# Patient Record
Sex: FEMALE | Race: OTHER | Employment: UNEMPLOYED | URBAN - METROPOLITAN AREA
[De-identification: names, ages, dates, MRNs, and addresses within clinical notes are randomized per-mention and may not be internally consistent; named-entity substitution may affect disease eponyms.]

---

## 2017-02-21 RX ORDER — LEVOTHYROXINE SODIUM 88 UG/1
TABLET ORAL
Qty: 90 TABLET | Refills: 0 | Status: SHIPPED | OUTPATIENT
Start: 2017-02-21 | End: 2017-05-16

## 2017-05-16 ENCOUNTER — OFFICE VISIT (OUTPATIENT)
Dept: FAMILY MEDICINE CLINIC | Facility: CLINIC | Age: 57
End: 2017-05-16

## 2017-05-16 VITALS
OXYGEN SATURATION: 99 % | DIASTOLIC BLOOD PRESSURE: 84 MMHG | TEMPERATURE: 98 F | RESPIRATION RATE: 16 BRPM | WEIGHT: 134 LBS | HEART RATE: 63 BPM | BODY MASS INDEX: 24.04 KG/M2 | HEIGHT: 62.5 IN | SYSTOLIC BLOOD PRESSURE: 128 MMHG

## 2017-05-16 DIAGNOSIS — M79.671 RIGHT FOOT PAIN: Primary | ICD-10-CM

## 2017-05-16 DIAGNOSIS — R12 HEARTBURN: ICD-10-CM

## 2017-05-16 PROCEDURE — 99213 OFFICE O/P EST LOW 20 MIN: CPT | Performed by: INTERNAL MEDICINE

## 2017-05-16 RX ORDER — LEVOTHYROXINE SODIUM 88 UG/1
88 TABLET ORAL
Qty: 90 TABLET | Refills: 2 | Status: SHIPPED | OUTPATIENT
Start: 2017-05-16 | End: 2017-06-05

## 2017-05-17 PROBLEM — E03.9 HYPOTHYROID: Status: ACTIVE | Noted: 2017-05-17

## 2017-05-17 NOTE — PROGRESS NOTES
Litzy Hartman is a 56year old female. HPI:   Here with 2 weeks of right foot pain. Recalls a few months ago that she banged her foot into a piece of furniture. Also went on many long walks prior to the pain starting. No swelling or bruising.  Using ice o area of previous burning. No tenderness on exam; soft abdomen  MS: pt is guarding during most of the foot exam; negative ankle exam; point tenderness to the lateral forefoot; no toe pain; no pain with foot squeeze; 1+ pedal pulse; normal gait.   EXT: no cya

## 2017-05-20 ENCOUNTER — HOSPITAL ENCOUNTER (OUTPATIENT)
Dept: GENERAL RADIOLOGY | Age: 57
Discharge: HOME | End: 2017-05-20
Attending: INTERNAL MEDICINE
Payer: COMMERCIAL

## 2017-05-20 DIAGNOSIS — M79.671 RIGHT FOOT PAIN: ICD-10-CM

## 2017-05-20 PROCEDURE — 73630 X-RAY EXAM OF FOOT: CPT | Performed by: INTERNAL MEDICINE

## 2017-05-23 DIAGNOSIS — M84.374A STRESS FRACTURE, RIGHT FOOT, INITIAL ENCOUNTER FOR FRACTURE: Primary | ICD-10-CM

## 2017-06-05 ENCOUNTER — TELEPHONE (OUTPATIENT)
Dept: FAMILY MEDICINE CLINIC | Facility: CLINIC | Age: 57
End: 2017-06-05

## 2017-06-05 RX ORDER — LEVOTHYROXINE SODIUM 88 UG/1
TABLET ORAL
Qty: 90 TABLET | Refills: 0 | OUTPATIENT
Start: 2017-06-05

## 2017-06-05 RX ORDER — LEVOTHYROXINE SODIUM 88 UG/1
88 TABLET ORAL
Qty: 90 TABLET | Refills: 2 | Status: SHIPPED | OUTPATIENT
Start: 2017-06-05 | End: 2018-03-07

## 2017-07-28 ENCOUNTER — OFFICE VISIT (OUTPATIENT)
Dept: FAMILY MEDICINE CLINIC | Facility: CLINIC | Age: 57
End: 2017-07-28

## 2017-07-28 ENCOUNTER — LAB ENCOUNTER (OUTPATIENT)
Dept: LAB | Age: 57
End: 2017-07-28
Attending: FAMILY MEDICINE
Payer: COMMERCIAL

## 2017-07-28 VITALS
RESPIRATION RATE: 18 BRPM | TEMPERATURE: 98 F | SYSTOLIC BLOOD PRESSURE: 116 MMHG | BODY MASS INDEX: 23.33 KG/M2 | DIASTOLIC BLOOD PRESSURE: 84 MMHG | WEIGHT: 130 LBS | HEIGHT: 62.5 IN | HEART RATE: 76 BPM

## 2017-07-28 DIAGNOSIS — M79.604 PAIN IN BOTH LOWER EXTREMITIES: Primary | ICD-10-CM

## 2017-07-28 DIAGNOSIS — M54.5 CHRONIC BILATERAL LOW BACK PAIN, WITH SCIATICA PRESENCE UNSPECIFIED: ICD-10-CM

## 2017-07-28 DIAGNOSIS — G89.29 CHRONIC BILATERAL LOW BACK PAIN, WITH SCIATICA PRESENCE UNSPECIFIED: ICD-10-CM

## 2017-07-28 DIAGNOSIS — M79.605 PAIN IN BOTH LOWER EXTREMITIES: ICD-10-CM

## 2017-07-28 DIAGNOSIS — M79.604 PAIN IN BOTH LOWER EXTREMITIES: ICD-10-CM

## 2017-07-28 DIAGNOSIS — M79.605 PAIN IN BOTH LOWER EXTREMITIES: Primary | ICD-10-CM

## 2017-07-28 LAB
ALBUMIN SERPL-MCNC: 3.9 G/DL (ref 3.5–4.8)
ALP LIVER SERPL-CCNC: 74 U/L (ref 41–108)
ALT SERPL-CCNC: 29 U/L (ref 14–54)
AST SERPL-CCNC: 17 U/L (ref 15–41)
BASOPHILS # BLD AUTO: 0.07 X10(3) UL (ref 0–0.1)
BASOPHILS NFR BLD AUTO: 1 %
BILIRUB SERPL-MCNC: 0.4 MG/DL (ref 0.1–2)
BUN BLD-MCNC: 10 MG/DL (ref 8–20)
CALCIUM BLD-MCNC: 9.1 MG/DL (ref 8.3–10.3)
CHLORIDE: 106 MMOL/L (ref 101–111)
CO2: 26 MMOL/L (ref 22–32)
CREAT BLD-MCNC: 0.83 MG/DL (ref 0.55–1.02)
EOSINOPHIL # BLD AUTO: 0.06 X10(3) UL (ref 0–0.3)
EOSINOPHIL NFR BLD AUTO: 0.8 %
ERYTHROCYTE [DISTWIDTH] IN BLOOD BY AUTOMATED COUNT: 12.1 % (ref 11.5–16)
FREE T4: 1.4 NG/DL (ref 0.9–1.8)
GLUCOSE BLD-MCNC: 88 MG/DL (ref 70–99)
HCT VFR BLD AUTO: 43.5 % (ref 34–50)
HGB BLD-MCNC: 14.7 G/DL (ref 12–16)
IMMATURE GRANULOCYTE COUNT: 0.01 X10(3) UL (ref 0–1)
IMMATURE GRANULOCYTE RATIO %: 0.1 %
LYMPHOCYTES # BLD AUTO: 2.84 X10(3) UL (ref 0.9–4)
LYMPHOCYTES NFR BLD AUTO: 38.7 %
M PROTEIN MFR SERPL ELPH: 8.2 G/DL (ref 6.1–8.3)
MCH RBC QN AUTO: 31.1 PG (ref 27–33.2)
MCHC RBC AUTO-ENTMCNC: 33.8 G/DL (ref 31–37)
MCV RBC AUTO: 92 FL (ref 81–100)
MONOCYTES # BLD AUTO: 0.44 X10(3) UL (ref 0.1–0.6)
MONOCYTES NFR BLD AUTO: 6 %
NEUTROPHIL ABS PRELIM: 3.91 X10 (3) UL (ref 1.3–6.7)
NEUTROPHILS # BLD AUTO: 3.91 X10(3) UL (ref 1.3–6.7)
NEUTROPHILS NFR BLD AUTO: 53.4 %
PLATELET # BLD AUTO: 375 10(3)UL (ref 150–450)
POTASSIUM SERPL-SCNC: 3.8 MMOL/L (ref 3.6–5.1)
RBC # BLD AUTO: 4.73 X10(6)UL (ref 3.8–5.1)
RED CELL DISTRIBUTION WIDTH-SD: 41.1 FL (ref 35.1–46.3)
RHEUMATOID FACT SERPL-ACNC: <10 IU/ML (ref ?–15)
SED RATE-ML: 16 MM/HR (ref 0–25)
SODIUM SERPL-SCNC: 141 MMOL/L (ref 136–144)
TSI SER-ACNC: 2.66 MIU/ML (ref 0.35–5.5)
WBC # BLD AUTO: 7.3 X10(3) UL (ref 4–13)

## 2017-07-28 PROCEDURE — 86431 RHEUMATOID FACTOR QUANT: CPT | Performed by: FAMILY MEDICINE

## 2017-07-28 PROCEDURE — 86200 CCP ANTIBODY: CPT | Performed by: FAMILY MEDICINE

## 2017-07-28 PROCEDURE — 99213 OFFICE O/P EST LOW 20 MIN: CPT | Performed by: FAMILY MEDICINE

## 2017-07-28 PROCEDURE — 85652 RBC SED RATE AUTOMATED: CPT | Performed by: FAMILY MEDICINE

## 2017-07-28 PROCEDURE — 36415 COLL VENOUS BLD VENIPUNCTURE: CPT | Performed by: FAMILY MEDICINE

## 2017-07-28 PROCEDURE — 84439 ASSAY OF FREE THYROXINE: CPT | Performed by: FAMILY MEDICINE

## 2017-07-28 PROCEDURE — 86038 ANTINUCLEAR ANTIBODIES: CPT | Performed by: FAMILY MEDICINE

## 2017-07-28 PROCEDURE — 80050 GENERAL HEALTH PANEL: CPT | Performed by: FAMILY MEDICINE

## 2017-07-28 NOTE — PROGRESS NOTES
HPI:    Patient ID: Janis Garcia is a 56year old female. 3 months ago was in [place] and then had episode of diarrhea. No melena, hematochezia. After that thinks her symptoms started. First started as right foot pain.   Then recently over the last TSH+FREE T4; Future  - DAVION, DIRECT, REFLEX TO 9 ENAS; Future  - RHEUMATOID ARTHRITIS FACTOR; Future  - SED RATE, WESTERGREN (AUTOMATED); Future  - DAVION, DIRECT, REFLEX TO 9 ENAS; Future  - CYCLIC CITRULLINATE PEP. IGG; Future    2.  Chronic bilateral low brandie

## 2017-07-30 LAB
ANA SCREEN: NEGATIVE
CYCLIC CITRULLINATED PEPTIDE: 3 UNITS

## 2017-10-07 ENCOUNTER — TELEPHONE (OUTPATIENT)
Dept: FAMILY MEDICINE CLINIC | Facility: CLINIC | Age: 57
End: 2017-10-07

## 2017-10-07 DIAGNOSIS — M79.605 BILATERAL LEG PAIN: Primary | ICD-10-CM

## 2017-10-07 DIAGNOSIS — M79.604 BILATERAL LEG PAIN: Primary | ICD-10-CM

## 2017-11-28 ENCOUNTER — OFFICE VISIT (OUTPATIENT)
Dept: NEUROLOGY | Facility: CLINIC | Age: 57
End: 2017-11-28

## 2017-11-28 VITALS
RESPIRATION RATE: 16 BRPM | HEART RATE: 80 BPM | SYSTOLIC BLOOD PRESSURE: 122 MMHG | WEIGHT: 138 LBS | BODY MASS INDEX: 25 KG/M2 | DIASTOLIC BLOOD PRESSURE: 76 MMHG

## 2017-11-28 DIAGNOSIS — M79.671 RIGHT FOOT PAIN: Primary | ICD-10-CM

## 2017-11-28 DIAGNOSIS — M79.604 RIGHT LEG PAIN: ICD-10-CM

## 2017-11-28 PROCEDURE — 99244 OFF/OP CNSLTJ NEW/EST MOD 40: CPT | Performed by: OTHER

## 2017-11-28 RX ORDER — ACETAMINOPHEN 160 MG
2000 TABLET,DISINTEGRATING ORAL DAILY
COMMUNITY

## 2017-11-28 NOTE — PATIENT INSTRUCTIONS
Refill policies:    • Allow 2-3 business days for refills; controlled substances may take longer.   • Contact your pharmacy at least 5 days prior to running out of medication and have them send an electronic request or submit request through the [facility] have a procedure or additional testing performed. [facility]) will contact your insurance carrier to obtain pre-certification or prior authorization.     Unfortunately, [facility] has seen an increase in denial of payment even though the p

## 2017-11-28 NOTE — PROGRESS NOTES
Pt here for evaluation of pain in right foot. Pt denies any numbness/tingling/burning. Pain is intermittent and varies in intensity, and hurts more when patient has to stand for long periods of time, walking, or in certain positions.   Pain has been prese

## 2017-11-30 NOTE — PROGRESS NOTES
HPI:    Patient ID: Elsa León is a 56year old female. Referring provider: Dr Nisha Gunter    Thank you for requesting this consultation to us. Below is the summary of my evaluation.      HPI    Elsa León is a 56year old female who presented for e arthralgias and back pain. Negative for joint swelling, myalgias, neck pain and neck stiffness. Skin: Negative. Allergic/Immunologic: Negative. Neurological: Negative for dizziness, facial asymmetry, weakness and numbness.    Hematological: Negative vibration and proprioception  Motor: Normal tone and bulk in all extremities. Strength is 5/5 in all muscle groups  Reflexes: symmetric and present, trace achilles reflexes bilaterally  Coordination: Intact   Gait: Normal based and station and steady.

## 2017-12-22 ENCOUNTER — HOSPITAL ENCOUNTER (OUTPATIENT)
Dept: MRI IMAGING | Facility: HOSPITAL | Age: 57
Discharge: HOME | End: 2017-12-22
Attending: Other
Payer: COMMERCIAL

## 2017-12-22 DIAGNOSIS — M79.671 RIGHT FOOT PAIN: ICD-10-CM

## 2017-12-22 PROCEDURE — 72148 MRI LUMBAR SPINE W/O DYE: CPT | Performed by: OTHER

## 2018-01-02 ENCOUNTER — TELEPHONE (OUTPATIENT)
Dept: NEUROLOGY | Facility: CLINIC | Age: 58
End: 2018-01-02

## 2018-01-03 NOTE — TELEPHONE ENCOUNTER
Left detailed message on 's confidential voice mail (OK per HIPAA) relaying message from Dr. Darryl Guerrero regarding MRI results. Confirmed that patient had EMG appt on 1/17/18 and f/u appt on 1/24/18. Encouraged pt to c/b office with any questions.

## 2018-01-17 ENCOUNTER — OFFICE VISIT (OUTPATIENT)
Dept: ELECTROPHYSIOLOGY | Facility: HOSPITAL | Age: 58
End: 2018-01-17
Attending: Other
Payer: COMMERCIAL

## 2018-01-17 DIAGNOSIS — M79.606 LEG PAIN: ICD-10-CM

## 2018-01-18 NOTE — PROCEDURES
[address]      PATIENT'S NAME: Rosalina Guo   REFERRING PHYSICIAN: Cinthia Leone M.D.    PATIENT ACCOUNT #: [de-identified] LOCATION: [facility]   MEDICAL RECORD #: [id number] DATE OF BIRTH: [birth year]

## 2018-01-24 ENCOUNTER — OFFICE VISIT (OUTPATIENT)
Dept: NEUROLOGY | Facility: CLINIC | Age: 58
End: 2018-01-24

## 2018-01-24 VITALS
WEIGHT: 140 LBS | HEART RATE: 88 BPM | SYSTOLIC BLOOD PRESSURE: 122 MMHG | BODY MASS INDEX: 25 KG/M2 | RESPIRATION RATE: 16 BRPM | DIASTOLIC BLOOD PRESSURE: 82 MMHG

## 2018-01-24 DIAGNOSIS — M79.671 RIGHT FOOT PAIN: ICD-10-CM

## 2018-01-24 DIAGNOSIS — M51.36 DEGENERATIVE DISC DISEASE, LUMBAR: Primary | ICD-10-CM

## 2018-01-24 PROCEDURE — 99213 OFFICE O/P EST LOW 20 MIN: CPT | Performed by: OTHER

## 2018-01-24 NOTE — PATIENT INSTRUCTIONS
Refill policies:    • Allow 2-3 business days for refills; controlled substances may take longer.   • Contact your pharmacy at least 5 days prior to running out of medication and have them send an electronic request or submit request through the [facility] recommended that you have a procedure or additional testing performed. [facility]) will contact your insurance carrier to obtain pre-certification or prior authorization.     Unfortunately, [facility] has seen an increase in denial of paym

## 2018-01-24 NOTE — PROGRESS NOTES
HPI:    Patient ID: Ciro Libyan is a 56year old female. Leg Pain    Pertinent negatives include no numbness. Patient is 56year old female who presented for follow up for right foot pain.  MRI lumbar spine minimal degenerative changes and mild f Gastrointestinal: Negative. Endocrine: Negative. Genitourinary: Negative. Musculoskeletal: Positive for arthralgias and back pain. Negative for joint swelling, myalgias, neck pain and neck stiffness. Skin: Negative.     Allergic/Immunologic: N pinprick, vibration and proprioception  Motor: Normal tone and bulk in all extremities.  Strength is 5/5 in all muscle groups  Reflexes: symmetric and present, trace achilles reflexes bilaterally  Coordination: Intact   Gait: Normal based and station and st

## 2018-03-07 RX ORDER — LEVOTHYROXINE SODIUM 88 UG/1
TABLET ORAL
Qty: 90 TABLET | Refills: 0 | Status: SHIPPED | OUTPATIENT
Start: 2018-03-07 | End: 2018-06-06

## 2018-03-14 ENCOUNTER — TELEPHONE (OUTPATIENT)
Dept: FAMILY MEDICINE CLINIC | Facility: CLINIC | Age: 58
End: 2018-03-14

## 2018-03-14 NOTE — TELEPHONE ENCOUNTER
Fax sent to us from [address], [address]    Billing information for patient. Advising us that patient will . Enveloped and put up at the front counter.

## 2018-06-06 RX ORDER — LEVOTHYROXINE SODIUM 88 UG/1
TABLET ORAL
Qty: 90 TABLET | Refills: 0 | Status: SHIPPED | OUTPATIENT
Start: 2018-06-06 | End: 2018-09-19

## 2018-09-17 RX ORDER — LEVOTHYROXINE SODIUM 88 UG/1
TABLET ORAL
Qty: 90 TABLET | Refills: 0 | OUTPATIENT
Start: 2018-09-17

## 2018-09-18 NOTE — TELEPHONE ENCOUNTER
Left patient message that Rajiv Fisher needs her to make an appt to fill her LEVOTHYROXINE SODIUM 88 MCG Oral Tab. TAKE 1 TABLET(88 MCG) BY MOUTH EVERY DAY. 90 Tabs.

## 2018-09-19 RX ORDER — LEVOTHYROXINE SODIUM 88 UG/1
TABLET ORAL
Qty: 30 TABLET | Refills: 0 | Status: SHIPPED | OUTPATIENT
Start: 2018-09-19 | End: 2018-10-15

## 2018-09-19 NOTE — TELEPHONE ENCOUNTER
Approve/Deny Levothyroxine   Last saw Dr Gilberto Ferrari 7/2017  Last filled 6/2018  Pt has appt scheduled 10/15/2018

## 2018-10-15 ENCOUNTER — OFFICE VISIT (OUTPATIENT)
Dept: FAMILY MEDICINE CLINIC | Facility: CLINIC | Age: 58
End: 2018-10-15
Payer: COMMERCIAL

## 2018-10-15 VITALS
HEART RATE: 70 BPM | DIASTOLIC BLOOD PRESSURE: 85 MMHG | WEIGHT: 142 LBS | HEIGHT: 62 IN | TEMPERATURE: 98 F | RESPIRATION RATE: 16 BRPM | BODY MASS INDEX: 26.13 KG/M2 | SYSTOLIC BLOOD PRESSURE: 125 MMHG | OXYGEN SATURATION: 98 %

## 2018-10-15 DIAGNOSIS — E55.9 VITAMIN D DEFICIENCY: ICD-10-CM

## 2018-10-15 DIAGNOSIS — R09.82 PND (POST-NASAL DRIP): ICD-10-CM

## 2018-10-15 DIAGNOSIS — Z12.31 ENCOUNTER FOR SCREENING MAMMOGRAM FOR HIGH-RISK PATIENT: ICD-10-CM

## 2018-10-15 DIAGNOSIS — Z12.11 COLON CANCER SCREENING: ICD-10-CM

## 2018-10-15 DIAGNOSIS — E03.8 OTHER SPECIFIED HYPOTHYROIDISM: Primary | ICD-10-CM

## 2018-10-15 DIAGNOSIS — E78.00 ELEVATED CHOLESTEROL: ICD-10-CM

## 2018-10-15 PROCEDURE — 99214 OFFICE O/P EST MOD 30 MIN: CPT | Performed by: FAMILY MEDICINE

## 2018-10-15 RX ORDER — FLUTICASONE PROPIONATE 50 MCG
2 SPRAY, SUSPENSION (ML) NASAL NIGHTLY
Qty: 1 BOTTLE | Refills: 0 | Status: SHIPPED | OUTPATIENT
Start: 2018-10-15 | End: 2018-11-14

## 2018-10-15 RX ORDER — LEVOTHYROXINE SODIUM 88 UG/1
TABLET ORAL
Qty: 30 TABLET | Refills: 0 | Status: SHIPPED | OUTPATIENT
Start: 2018-10-15 | End: 2018-11-14

## 2018-10-15 NOTE — PROGRESS NOTES
HPI:   Kyaw Rai is a 66year old female who presents for thyroid, cholesterol and throat concerns     Pt denies thyroid symptoms   No labs since 7/17     H/o elevated cholesterol   No regular exercise     Pt overdue for mammogram / colon cancer screen normal, cobblestoning of PP, pale nasal mucosa, no sinus tenderness   EYES:PERRLA, EOMI, normal,conjunctiva are clear  SKIN: norashes,no suspicious lesions  MUSCULOSKELETAL: back is not tender,FROM of the back  EXTREMITIES: no cyanosis, clubbing or edema

## 2018-10-18 RX ORDER — LEVOTHYROXINE SODIUM 88 UG/1
TABLET ORAL
Qty: 30 TABLET | Refills: 0 | OUTPATIENT
Start: 2018-10-18

## 2018-10-27 ENCOUNTER — APPOINTMENT (OUTPATIENT)
Dept: LAB | Age: 58
End: 2018-10-27
Attending: FAMILY MEDICINE
Payer: COMMERCIAL

## 2018-10-27 DIAGNOSIS — E55.9 VITAMIN D DEFICIENCY: ICD-10-CM

## 2018-10-27 DIAGNOSIS — E03.8 OTHER SPECIFIED HYPOTHYROIDISM: ICD-10-CM

## 2018-10-27 DIAGNOSIS — E78.00 ELEVATED CHOLESTEROL: ICD-10-CM

## 2018-10-27 PROCEDURE — 36415 COLL VENOUS BLD VENIPUNCTURE: CPT | Performed by: FAMILY MEDICINE

## 2018-10-27 PROCEDURE — 80061 LIPID PANEL: CPT | Performed by: FAMILY MEDICINE

## 2018-10-27 PROCEDURE — 84443 ASSAY THYROID STIM HORMONE: CPT | Performed by: FAMILY MEDICINE

## 2018-10-27 PROCEDURE — 84439 ASSAY OF FREE THYROXINE: CPT | Performed by: FAMILY MEDICINE

## 2018-10-27 PROCEDURE — 82306 VITAMIN D 25 HYDROXY: CPT | Performed by: FAMILY MEDICINE

## 2018-11-14 DIAGNOSIS — E03.8 OTHER SPECIFIED HYPOTHYROIDISM: ICD-10-CM

## 2018-11-14 DIAGNOSIS — R09.82 PND (POST-NASAL DRIP): ICD-10-CM

## 2018-11-14 RX ORDER — FLUTICASONE PROPIONATE 50 MCG
SPRAY, SUSPENSION (ML) NASAL
Qty: 1 BOTTLE | Refills: 0 | Status: SHIPPED | OUTPATIENT
Start: 2018-11-14 | End: 2019-05-14

## 2018-11-14 RX ORDER — LEVOTHYROXINE SODIUM 88 UG/1
TABLET ORAL
Qty: 90 TABLET | Refills: 1 | Status: SHIPPED | OUTPATIENT
Start: 2018-11-14 | End: 2019-05-14

## 2018-11-14 NOTE — TELEPHONE ENCOUNTER
Rx Request  Levothyroxine Sodium 88 MCG Oral Tab  Disp:        30            R: 0  Associated Dx: Other specified hypothyroidism  Last Refilled:10/15/2018    Fluticasone Propionate 50 MCG/ACT Nasal Suspension  Disp:    1 bottle                R: o  Associated Dx: PND (post-nasal drip)  Last Refilled:10/15/2018    Last Visit: 10/15/2018

## 2019-05-14 ENCOUNTER — OFFICE VISIT (OUTPATIENT)
Dept: FAMILY MEDICINE CLINIC | Facility: CLINIC | Age: 59
End: 2019-05-14
Payer: COMMERCIAL

## 2019-05-14 VITALS
BODY MASS INDEX: 24.4 KG/M2 | TEMPERATURE: 99 F | RESPIRATION RATE: 20 BRPM | HEIGHT: 62.5 IN | WEIGHT: 136 LBS | DIASTOLIC BLOOD PRESSURE: 86 MMHG | SYSTOLIC BLOOD PRESSURE: 122 MMHG | HEART RATE: 70 BPM | OXYGEN SATURATION: 97 %

## 2019-05-14 DIAGNOSIS — E03.9 HYPOTHYROIDISM, UNSPECIFIED TYPE: ICD-10-CM

## 2019-05-14 DIAGNOSIS — E78.5 HYPERLIPIDEMIA, UNSPECIFIED HYPERLIPIDEMIA TYPE: ICD-10-CM

## 2019-05-14 DIAGNOSIS — R51.9 HEADACHE DISORDER: Primary | ICD-10-CM

## 2019-05-14 PROCEDURE — 99214 OFFICE O/P EST MOD 30 MIN: CPT | Performed by: FAMILY MEDICINE

## 2019-05-14 RX ORDER — EZETIMIBE 10 MG/1
10 TABLET ORAL NIGHTLY
Qty: 30 TABLET | Refills: 1 | Status: SHIPPED | OUTPATIENT
Start: 2019-05-14 | End: 2019-07-24

## 2019-05-14 RX ORDER — LEVOTHYROXINE SODIUM 88 UG/1
88 TABLET ORAL
Qty: 90 TABLET | Refills: 1 | Status: SHIPPED | OUTPATIENT
Start: 2019-05-14 | End: 2019-09-27

## 2019-05-14 NOTE — PROGRESS NOTES
HPI:    Patient ID: Marva Mohs is a 64year old female. Headache    This is a chronic problem. The current episode started more than 1 month ago (since november). The problem occurs intermittently (only in the evenings). The problem has been unchanged. Vitamin D3 2000 units Oral Cap Take 2,000 Units by mouth daily. Disp:  Rfl:      Allergies:  Erythromycin            NAUSEA ONLY   PHYSICAL EXAM:   Physical Exam   Constitutional: She appears well-developed and well-nourished.    Eyes: Conjunctivae are no

## 2019-07-24 DIAGNOSIS — E78.5 HYPERLIPIDEMIA, UNSPECIFIED HYPERLIPIDEMIA TYPE: ICD-10-CM

## 2019-07-24 RX ORDER — EZETIMIBE 10 MG/1
TABLET ORAL
Qty: 30 TABLET | Refills: 0 | Status: SHIPPED | OUTPATIENT
Start: 2019-07-24 | End: 2019-08-22

## 2019-08-22 DIAGNOSIS — E78.5 HYPERLIPIDEMIA, UNSPECIFIED HYPERLIPIDEMIA TYPE: ICD-10-CM

## 2019-08-22 RX ORDER — EZETIMIBE 10 MG/1
TABLET ORAL
Qty: 30 TABLET | Refills: 0 | Status: SHIPPED | OUTPATIENT
Start: 2019-08-22 | End: 2019-10-01

## 2019-09-07 ENCOUNTER — LABORATORY ENCOUNTER (OUTPATIENT)
Dept: LAB | Age: 59
End: 2019-09-07
Attending: FAMILY MEDICINE
Payer: COMMERCIAL

## 2019-09-07 DIAGNOSIS — E78.2 ELEVATED TRIGLYCERIDES WITH HIGH CHOLESTEROL: ICD-10-CM

## 2019-09-07 DIAGNOSIS — E78.00 ELEVATED CHOLESTEROL: ICD-10-CM

## 2019-09-07 DIAGNOSIS — E78.5 HYPERLIPIDEMIA, UNSPECIFIED HYPERLIPIDEMIA TYPE: ICD-10-CM

## 2019-09-07 DIAGNOSIS — E03.9 HYPOTHYROIDISM, UNSPECIFIED TYPE: ICD-10-CM

## 2019-09-07 LAB
ALBUMIN SERPL-MCNC: 3.6 G/DL (ref 3.4–5)
ALBUMIN/GLOB SERPL: 0.9 {RATIO} (ref 1–2)
ALP LIVER SERPL-CCNC: 75 U/L (ref 46–118)
ALT SERPL-CCNC: 38 U/L (ref 13–56)
ANION GAP SERPL CALC-SCNC: 7 MMOL/L (ref 0–18)
AST SERPL-CCNC: 20 U/L (ref 15–37)
BILIRUB SERPL-MCNC: 0.7 MG/DL (ref 0.1–2)
BUN BLD-MCNC: 15 MG/DL (ref 7–18)
BUN/CREAT SERPL: 17 (ref 10–20)
CALCIUM BLD-MCNC: 9.2 MG/DL (ref 8.5–10.1)
CHLORIDE SERPL-SCNC: 107 MMOL/L (ref 98–112)
CHOLEST SMN-MCNC: 263 MG/DL (ref ?–200)
CO2 SERPL-SCNC: 27 MMOL/L (ref 21–32)
CREAT BLD-MCNC: 0.88 MG/DL (ref 0.55–1.02)
GLOBULIN PLAS-MCNC: 4.1 G/DL (ref 2.8–4.4)
GLUCOSE BLD-MCNC: 104 MG/DL (ref 70–99)
HDLC SERPL-MCNC: 37 MG/DL (ref 40–59)
LDLC SERPL CALC-MCNC: 165 MG/DL (ref ?–100)
M PROTEIN MFR SERPL ELPH: 7.7 G/DL (ref 6.4–8.2)
NONHDLC SERPL-MCNC: 226 MG/DL (ref ?–130)
OSMOLALITY SERPL CALC.SUM OF ELEC: 293 MOSM/KG (ref 275–295)
POTASSIUM SERPL-SCNC: 4.9 MMOL/L (ref 3.5–5.1)
SODIUM SERPL-SCNC: 141 MMOL/L (ref 136–145)
T4 FREE SERPL-MCNC: 1.2 NG/DL (ref 0.8–1.7)
TRIGL SERPL-MCNC: 303 MG/DL (ref 30–149)
TSI SER-ACNC: 5.2 MIU/ML (ref 0.36–3.74)
VLDLC SERPL CALC-MCNC: 61 MG/DL (ref 0–30)

## 2019-09-07 PROCEDURE — 80061 LIPID PANEL: CPT | Performed by: FAMILY MEDICINE

## 2019-09-07 PROCEDURE — 36415 COLL VENOUS BLD VENIPUNCTURE: CPT | Performed by: FAMILY MEDICINE

## 2019-09-07 PROCEDURE — 84443 ASSAY THYROID STIM HORMONE: CPT | Performed by: FAMILY MEDICINE

## 2019-09-07 PROCEDURE — 84439 ASSAY OF FREE THYROXINE: CPT | Performed by: FAMILY MEDICINE

## 2019-09-07 PROCEDURE — 80053 COMPREHEN METABOLIC PANEL: CPT | Performed by: FAMILY MEDICINE

## 2019-09-16 ENCOUNTER — TELEPHONE (OUTPATIENT)
Dept: FAMILY MEDICINE CLINIC | Facility: CLINIC | Age: 59
End: 2019-09-16

## 2019-09-21 ENCOUNTER — APPOINTMENT (OUTPATIENT)
Dept: LAB | Age: 59
End: 2019-09-21
Attending: FAMILY MEDICINE
Payer: COMMERCIAL

## 2019-09-21 DIAGNOSIS — R73.09 ELEVATED GLUCOSE: ICD-10-CM

## 2019-09-21 LAB
EST. AVERAGE GLUCOSE BLD GHB EST-MCNC: 126 MG/DL (ref 68–126)
HBA1C MFR BLD HPLC: 6 % (ref ?–5.7)

## 2019-09-21 PROCEDURE — 36415 COLL VENOUS BLD VENIPUNCTURE: CPT

## 2019-09-21 PROCEDURE — 83036 HEMOGLOBIN GLYCOSYLATED A1C: CPT

## 2019-09-24 ENCOUNTER — TELEPHONE (OUTPATIENT)
Dept: FAMILY MEDICINE CLINIC | Facility: CLINIC | Age: 59
End: 2019-09-24

## 2019-09-27 DIAGNOSIS — E03.9 HYPOTHYROIDISM, UNSPECIFIED TYPE: ICD-10-CM

## 2019-09-27 RX ORDER — LEVOTHYROXINE SODIUM 88 UG/1
TABLET ORAL
Qty: 90 TABLET | Refills: 1 | Status: SHIPPED | OUTPATIENT
Start: 2019-09-27 | End: 2019-12-30

## 2019-10-01 ENCOUNTER — OFFICE VISIT (OUTPATIENT)
Dept: FAMILY MEDICINE CLINIC | Facility: CLINIC | Age: 59
End: 2019-10-01
Payer: COMMERCIAL

## 2019-10-01 VITALS
HEIGHT: 62.5 IN | RESPIRATION RATE: 18 BRPM | DIASTOLIC BLOOD PRESSURE: 74 MMHG | SYSTOLIC BLOOD PRESSURE: 118 MMHG | BODY MASS INDEX: 24.22 KG/M2 | WEIGHT: 135 LBS | OXYGEN SATURATION: 99 % | HEART RATE: 90 BPM

## 2019-10-01 DIAGNOSIS — E03.9 HYPOTHYROIDISM, UNSPECIFIED TYPE: ICD-10-CM

## 2019-10-01 DIAGNOSIS — R73.03 PREDIABETES: ICD-10-CM

## 2019-10-01 DIAGNOSIS — E78.5 HYPERLIPIDEMIA, UNSPECIFIED HYPERLIPIDEMIA TYPE: Primary | ICD-10-CM

## 2019-10-01 DIAGNOSIS — R51.9 HEADACHE DISORDER: ICD-10-CM

## 2019-10-01 DIAGNOSIS — Z12.39 BREAST CANCER SCREENING: ICD-10-CM

## 2019-10-01 PROCEDURE — 99214 OFFICE O/P EST MOD 30 MIN: CPT | Performed by: FAMILY MEDICINE

## 2019-10-01 NOTE — PATIENT INSTRUCTIONS
Evolocumab injection  Brand Name: Yaritza Pearce  What is this medicine? EVOLOCUMAB (e voe VIVIANA ue mab) is known as a PCSK9 inhibitor. It is used to lower the level of cholesterol in the blood.  It may be used alone or in combination with other cholesterol-lower If you miss a dose, take it as soon as you can if there are more than 7 days until the next scheduled dose, or skip the missed dose and take the next dose according to your original schedule. Do not take double or extra doses.   Where should I keep my medic

## 2019-10-01 NOTE — PROGRESS NOTES
HPI:    Patient ID: Ciro Weber is a 64year old female. Pt states that her sleep schedule has been off considering she falls asleep at 4-5 am and then she normally sleeps all day.  Pt has been noncompliant with mammograms and cologuard in the past. other systems reviewed and are negative.              Current Outpatient Medications:  LEVOTHYROXINE SODIUM 88 MCG Oral Tab TAKE 1 TABLET(88 MCG) BY MOUTH BEFORE BREAKFAST Disp: 90 tablet Rfl: 1   Vitamin D3 2000 units Oral Cap Take 2,000 Units by mouth janette [E]      Comp Metabolic Panel (14) [E]      CBC W Differential W Platelet [E]      Meds This Visit:  Requested Prescriptions      No prescriptions requested or ordered in this encounter       Imaging & Referrals:  KIRILL BANDA 2D+3D SCREENING BILAT (CPT=77067/

## 2019-12-30 ENCOUNTER — TELEPHONE (OUTPATIENT)
Dept: FAMILY MEDICINE CLINIC | Facility: CLINIC | Age: 59
End: 2019-12-30

## 2019-12-30 DIAGNOSIS — E03.9 HYPOTHYROIDISM, UNSPECIFIED TYPE: ICD-10-CM

## 2019-12-30 RX ORDER — LEVOTHYROXINE SODIUM 88 UG/1
TABLET ORAL
Qty: 90 TABLET | Refills: 1 | Status: SHIPPED | OUTPATIENT
Start: 2019-12-30 | End: 2020-06-16

## 2019-12-30 NOTE — TELEPHONE ENCOUNTER
Patient is needing refill urgently as she is leaving the country tomorrow for 10 weeks. Requesting 2 more refills of LEVOTHYROXINE SODIUM 88 MCG Oral Tab    Please advise ASAP.

## 2020-06-16 DIAGNOSIS — E03.9 HYPOTHYROIDISM, UNSPECIFIED TYPE: ICD-10-CM

## 2020-06-16 RX ORDER — LEVOTHYROXINE SODIUM 88 UG/1
TABLET ORAL
Qty: 90 TABLET | Refills: 1 | Status: SHIPPED | OUTPATIENT
Start: 2020-06-16 | End: 2020-11-30

## 2020-11-30 DIAGNOSIS — E78.5 HYPERLIPIDEMIA, UNSPECIFIED HYPERLIPIDEMIA TYPE: ICD-10-CM

## 2020-11-30 DIAGNOSIS — Z13.0 ENCOUNTER FOR SCREENING FOR HEMATOLOGIC DISORDER: ICD-10-CM

## 2020-11-30 DIAGNOSIS — E03.9 HYPOTHYROIDISM, UNSPECIFIED TYPE: Primary | ICD-10-CM

## 2020-12-02 RX ORDER — LEVOTHYROXINE SODIUM 88 UG/1
88 TABLET ORAL
Qty: 90 TABLET | Refills: 0 | Status: SHIPPED | OUTPATIENT
Start: 2020-12-02 | End: 2021-06-08

## 2021-02-01 ENCOUNTER — TELEPHONE (OUTPATIENT)
Dept: FAMILY MEDICINE CLINIC | Facility: CLINIC | Age: 61
End: 2021-02-01

## 2021-02-01 DIAGNOSIS — Z71.84 TRAVEL ADVICE ENCOUNTER: Primary | ICD-10-CM

## 2021-02-01 NOTE — TELEPHONE ENCOUNTER
said [place] and needs to add her blood type in some forms. He's asking for an order to be added to find out her blood type.   They are not looking to do a blood donation at this time

## 2021-02-02 NOTE — TELEPHONE ENCOUNTER
Diane notified, verbalized understanding. Advised to reach out to insurance for coverage, as it may not be covered due to medical necessity. Billing phone number provided.

## 2021-02-03 NOTE — TELEPHONE ENCOUNTER
Follow up in 0-4 weeks.  Otherwise no further refills
LVM to sched appt
Metformin - said pharmacy sent request - [facility]ala
Please call Pt to schedule a medication f/u.  No further refills without an appointment
Refill request:  LEVOTHYROXINE SODIUM 88 MCG Oral Tab    2020 6 months supply  10/1/2019 outstanding labs,  notified, they are requesting 2 month supply of meds, I reordered labs so that the order is not .    Approve/deny:
20

## 2021-03-10 ENCOUNTER — LABORATORY ENCOUNTER (OUTPATIENT)
Dept: LAB | Age: 61
End: 2021-03-10
Attending: FAMILY MEDICINE
Payer: COMMERCIAL

## 2021-03-10 DIAGNOSIS — Z71.84 TRAVEL ADVICE ENCOUNTER: ICD-10-CM

## 2021-03-10 DIAGNOSIS — Z13.0 ENCOUNTER FOR SCREENING FOR HEMATOLOGIC DISORDER: ICD-10-CM

## 2021-03-10 DIAGNOSIS — E03.9 HYPOTHYROIDISM, UNSPECIFIED TYPE: ICD-10-CM

## 2021-03-10 DIAGNOSIS — E78.5 HYPERLIPIDEMIA, UNSPECIFIED HYPERLIPIDEMIA TYPE: ICD-10-CM

## 2021-03-10 LAB
ALBUMIN SERPL-MCNC: 3.9 G/DL (ref 3.4–5)
ALBUMIN/GLOB SERPL: 0.9 {RATIO} (ref 1–2)
ALP LIVER SERPL-CCNC: 71 U/L
ALT SERPL-CCNC: 29 U/L
ANION GAP SERPL CALC-SCNC: 3 MMOL/L (ref 0–18)
AST SERPL-CCNC: 17 U/L (ref 15–37)
BASOPHILS # BLD AUTO: 0.07 X10(3) UL (ref 0–0.2)
BASOPHILS NFR BLD AUTO: 0.8 %
BILIRUB SERPL-MCNC: 0.5 MG/DL (ref 0.1–2)
BUN BLD-MCNC: 9 MG/DL (ref 7–18)
BUN/CREAT SERPL: 11.3 (ref 10–20)
CALCIUM BLD-MCNC: 9.6 MG/DL (ref 8.5–10.1)
CHLORIDE SERPL-SCNC: 105 MMOL/L (ref 98–112)
CHOLEST SMN-MCNC: 304 MG/DL (ref ?–200)
CO2 SERPL-SCNC: 29 MMOL/L (ref 21–32)
CREAT BLD-MCNC: 0.8 MG/DL
DEPRECATED RDW RBC AUTO: 41.1 FL (ref 35.1–46.3)
EOSINOPHIL # BLD AUTO: 0.07 X10(3) UL (ref 0–0.7)
EOSINOPHIL NFR BLD AUTO: 0.8 %
ERYTHROCYTE [DISTWIDTH] IN BLOOD BY AUTOMATED COUNT: 11.9 % (ref 11–15)
GLOBULIN PLAS-MCNC: 4.3 G/DL (ref 2.8–4.4)
GLUCOSE BLD-MCNC: 99 MG/DL (ref 70–99)
HCT VFR BLD AUTO: 45.5 %
HDLC SERPL-MCNC: 47 MG/DL (ref 40–59)
HGB BLD-MCNC: 15 G/DL
IMM GRANULOCYTES # BLD AUTO: 0.03 X10(3) UL (ref 0–1)
IMM GRANULOCYTES NFR BLD: 0.3 %
LDLC SERPL CALC-MCNC: 195 MG/DL (ref ?–100)
LYMPHOCYTES # BLD AUTO: 3.21 X10(3) UL (ref 1–4)
LYMPHOCYTES NFR BLD AUTO: 35.6 %
M PROTEIN MFR SERPL ELPH: 8.2 G/DL (ref 6.4–8.2)
MCH RBC QN AUTO: 31.4 PG (ref 26–34)
MCHC RBC AUTO-ENTMCNC: 33 G/DL (ref 31–37)
MCV RBC AUTO: 95.2 FL
MONOCYTES # BLD AUTO: 0.5 X10(3) UL (ref 0.1–1)
MONOCYTES NFR BLD AUTO: 5.5 %
NEUTROPHILS # BLD AUTO: 5.14 X10 (3) UL (ref 1.5–7.7)
NEUTROPHILS # BLD AUTO: 5.14 X10(3) UL (ref 1.5–7.7)
NEUTROPHILS NFR BLD AUTO: 57 %
NONHDLC SERPL-MCNC: 257 MG/DL (ref ?–130)
OSMOLALITY SERPL CALC.SUM OF ELEC: 283 MOSM/KG (ref 275–295)
PATIENT FASTING Y/N/NP: YES
PATIENT FASTING Y/N/NP: YES
PLATELET # BLD AUTO: 392 10(3)UL (ref 150–450)
POTASSIUM SERPL-SCNC: 4.3 MMOL/L (ref 3.5–5.1)
RBC # BLD AUTO: 4.78 X10(6)UL
RH BLOOD TYPE: POSITIVE
SODIUM SERPL-SCNC: 137 MMOL/L (ref 136–145)
T4 FREE SERPL-MCNC: 1.2 NG/DL (ref 0.8–1.7)
TRIGL SERPL-MCNC: 312 MG/DL (ref 30–149)
TSI SER-ACNC: 4.67 MIU/ML (ref 0.36–3.74)
VLDLC SERPL CALC-MCNC: 62 MG/DL (ref 0–30)
WBC # BLD AUTO: 9 X10(3) UL (ref 4–11)

## 2021-03-10 PROCEDURE — 36415 COLL VENOUS BLD VENIPUNCTURE: CPT | Performed by: FAMILY MEDICINE

## 2021-03-10 PROCEDURE — 80050 GENERAL HEALTH PANEL: CPT | Performed by: FAMILY MEDICINE

## 2021-03-10 PROCEDURE — 80061 LIPID PANEL: CPT | Performed by: FAMILY MEDICINE

## 2021-03-10 PROCEDURE — 84439 ASSAY OF FREE THYROXINE: CPT | Performed by: FAMILY MEDICINE

## 2021-03-10 PROCEDURE — 86901 BLOOD TYPING SEROLOGIC RH(D): CPT | Performed by: FAMILY MEDICINE

## 2021-03-10 PROCEDURE — 86900 BLOOD TYPING SEROLOGIC ABO: CPT | Performed by: FAMILY MEDICINE

## 2021-04-08 DIAGNOSIS — E03.9 HYPOTHYROIDISM, UNSPECIFIED TYPE: ICD-10-CM

## 2021-04-09 RX ORDER — LEVOTHYROXINE SODIUM 88 UG/1
TABLET ORAL
Qty: 90 TABLET | Refills: 0 | OUTPATIENT
Start: 2021-04-09

## 2021-06-07 DIAGNOSIS — E03.9 HYPOTHYROIDISM, UNSPECIFIED TYPE: ICD-10-CM

## 2021-06-08 RX ORDER — LEVOTHYROXINE SODIUM 88 UG/1
TABLET ORAL
Qty: 90 TABLET | Refills: 0 | Status: SHIPPED | OUTPATIENT
Start: 2021-06-08 | End: 2021-07-15

## 2021-06-17 ENCOUNTER — OFFICE VISIT (OUTPATIENT)
Dept: FAMILY MEDICINE CLINIC | Facility: CLINIC | Age: 61
End: 2021-06-17
Payer: COMMERCIAL

## 2021-06-17 VITALS
BODY MASS INDEX: 25.84 KG/M2 | RESPIRATION RATE: 20 BRPM | TEMPERATURE: 98 F | SYSTOLIC BLOOD PRESSURE: 126 MMHG | WEIGHT: 144 LBS | HEART RATE: 78 BPM | HEIGHT: 62.5 IN | OXYGEN SATURATION: 98 % | DIASTOLIC BLOOD PRESSURE: 78 MMHG

## 2021-06-17 DIAGNOSIS — L65.9 HAIR LOSS: Primary | ICD-10-CM

## 2021-06-17 DIAGNOSIS — R73.03 PREDIABETES: ICD-10-CM

## 2021-06-17 DIAGNOSIS — E78.5 HYPERLIPIDEMIA, UNSPECIFIED HYPERLIPIDEMIA TYPE: ICD-10-CM

## 2021-06-17 DIAGNOSIS — M25.561 BILATERAL CHRONIC KNEE PAIN: ICD-10-CM

## 2021-06-17 DIAGNOSIS — M25.562 BILATERAL CHRONIC KNEE PAIN: ICD-10-CM

## 2021-06-17 DIAGNOSIS — G89.29 BILATERAL CHRONIC KNEE PAIN: ICD-10-CM

## 2021-06-17 PROCEDURE — 99214 OFFICE O/P EST MOD 30 MIN: CPT | Performed by: FAMILY MEDICINE

## 2021-06-17 PROCEDURE — 3078F DIAST BP <80 MM HG: CPT | Performed by: FAMILY MEDICINE

## 2021-06-17 PROCEDURE — 3008F BODY MASS INDEX DOCD: CPT | Performed by: FAMILY MEDICINE

## 2021-06-17 PROCEDURE — 3074F SYST BP LT 130 MM HG: CPT | Performed by: FAMILY MEDICINE

## 2021-06-17 RX ORDER — CLOBETASOL PROPIONATE 0.5 MG/G
1 AEROSOL, FOAM TOPICAL 2 TIMES DAILY
Qty: 1 EACH | Refills: 1 | Status: SHIPPED | OUTPATIENT
Start: 2021-06-17

## 2021-06-17 NOTE — PROGRESS NOTES
HPI/Subjective:   Patient ID: Carlee Celestin is a 63year old female. Prediabetes  Hx of prediabetes. This is a chronic problem. This problem onset over 1 year ago. This problem occurs constantly. Pts last a1c on 9/21/19 was 6.0. Pt is not symptomatic.  Pt dyslipidemia. History/Other:   Review of Systems   Respiratory: Negative for shortness of breath. Cardiovascular: Negative for chest pain. Musculoskeletal: Positive for stiffness. Negative for myalgias.    Skin:        + burning skin, + hairloss based on results. - FERRITIN; Future  - TSH W REFLEX TO FREE T4; Future  - CBC WITH DIFFERENTIAL WITH PLATELET; Future  - VITAMIN D, 25-HYDROXY; Future  - TESTOSTERONE,FREE AND TOTAL;  Future  - Clobetasol Propionate 0.05 % External Foam; Apply 1 Applicati

## 2021-07-02 ENCOUNTER — LAB ENCOUNTER (OUTPATIENT)
Dept: LAB | Age: 61
End: 2021-07-02
Attending: FAMILY MEDICINE

## 2021-07-02 DIAGNOSIS — R73.03 PREDIABETES: ICD-10-CM

## 2021-07-02 DIAGNOSIS — L65.9 HAIR LOSS: ICD-10-CM

## 2021-07-02 DIAGNOSIS — E78.5 HYPERLIPIDEMIA, UNSPECIFIED HYPERLIPIDEMIA TYPE: ICD-10-CM

## 2021-07-02 LAB
BASOPHILS # BLD AUTO: 0.11 X10(3) UL (ref 0–0.2)
BASOPHILS NFR BLD AUTO: 1.2 %
CHOLEST SMN-MCNC: 286 MG/DL (ref ?–200)
DEPRECATED HBV CORE AB SER IA-ACNC: 67.6 NG/ML
DEPRECATED RDW RBC AUTO: 41.2 FL (ref 35.1–46.3)
EOSINOPHIL # BLD AUTO: 0.08 X10(3) UL (ref 0–0.7)
EOSINOPHIL NFR BLD AUTO: 0.9 %
ERYTHROCYTE [DISTWIDTH] IN BLOOD BY AUTOMATED COUNT: 12 % (ref 11–15)
EST. AVERAGE GLUCOSE BLD GHB EST-MCNC: 128 MG/DL (ref 68–126)
HBA1C MFR BLD HPLC: 6.1 % (ref ?–5.7)
HCT VFR BLD AUTO: 45 %
HDLC SERPL-MCNC: 42 MG/DL (ref 40–59)
HGB BLD-MCNC: 15.1 G/DL
IMM GRANULOCYTES # BLD AUTO: 0.09 X10(3) UL (ref 0–1)
IMM GRANULOCYTES NFR BLD: 1 %
LDLC SERPL CALC-MCNC: 180 MG/DL (ref ?–100)
LYMPHOCYTES # BLD AUTO: 3.3 X10(3) UL (ref 1–4)
LYMPHOCYTES NFR BLD AUTO: 35.4 %
MCH RBC QN AUTO: 31.2 PG (ref 26–34)
MCHC RBC AUTO-ENTMCNC: 33.6 G/DL (ref 31–37)
MCV RBC AUTO: 93 FL
MONOCYTES # BLD AUTO: 0.55 X10(3) UL (ref 0.1–1)
MONOCYTES NFR BLD AUTO: 5.9 %
NEUTROPHILS # BLD AUTO: 5.2 X10 (3) UL (ref 1.5–7.7)
NEUTROPHILS # BLD AUTO: 5.2 X10(3) UL (ref 1.5–7.7)
NEUTROPHILS NFR BLD AUTO: 55.6 %
NONHDLC SERPL-MCNC: 244 MG/DL (ref ?–130)
PATIENT FASTING Y/N/NP: YES
PLATELET # BLD AUTO: 381 10(3)UL (ref 150–450)
RBC # BLD AUTO: 4.84 X10(6)UL
TRIGL SERPL-MCNC: 326 MG/DL (ref 30–149)
TSI SER-ACNC: 3.55 MIU/ML (ref 0.36–3.74)
VIT D+METAB SERPL-MCNC: 28.8 NG/ML (ref 30–100)
VLDLC SERPL CALC-MCNC: 69 MG/DL (ref 0–30)
WBC # BLD AUTO: 9.3 X10(3) UL (ref 4–11)

## 2021-07-02 PROCEDURE — 84443 ASSAY THYROID STIM HORMONE: CPT | Performed by: FAMILY MEDICINE

## 2021-07-02 PROCEDURE — 83036 HEMOGLOBIN GLYCOSYLATED A1C: CPT | Performed by: FAMILY MEDICINE

## 2021-07-02 PROCEDURE — 84402 ASSAY OF FREE TESTOSTERONE: CPT | Performed by: FAMILY MEDICINE

## 2021-07-02 PROCEDURE — 85025 COMPLETE CBC W/AUTO DIFF WBC: CPT | Performed by: FAMILY MEDICINE

## 2021-07-02 PROCEDURE — 80061 LIPID PANEL: CPT | Performed by: FAMILY MEDICINE

## 2021-07-02 PROCEDURE — 82728 ASSAY OF FERRITIN: CPT | Performed by: FAMILY MEDICINE

## 2021-07-02 PROCEDURE — 84403 ASSAY OF TOTAL TESTOSTERONE: CPT | Performed by: FAMILY MEDICINE

## 2021-07-02 PROCEDURE — 82306 VITAMIN D 25 HYDROXY: CPT | Performed by: FAMILY MEDICINE

## 2021-07-07 LAB
SEX HORMONE BINDING GLOBULIN: 40 NMOL/L
TESTOSTERONE -MS, BIOAVAILAB: 10.6 NG/DL
TESTOSTERONE, -MS/MS: 26 NG/DL
TESTOSTERONE, FREE -MS/MS: 3.9 PG/ML

## 2021-07-15 DIAGNOSIS — E03.9 HYPOTHYROIDISM, UNSPECIFIED TYPE: ICD-10-CM

## 2021-07-15 RX ORDER — LEVOTHYROXINE SODIUM 88 UG/1
TABLET ORAL
Qty: 90 TABLET | Refills: 0 | Status: SHIPPED | OUTPATIENT
Start: 2021-07-15 | End: 2021-09-09

## 2021-07-15 RX ORDER — LEVOTHYROXINE SODIUM 88 UG/1
TABLET ORAL
Qty: 90 TABLET | Refills: 0 | Status: CANCELLED | OUTPATIENT
Start: 2021-07-15

## 2021-09-09 DIAGNOSIS — E03.9 HYPOTHYROIDISM, UNSPECIFIED TYPE: ICD-10-CM

## 2021-09-09 RX ORDER — LEVOTHYROXINE SODIUM 88 UG/1
TABLET ORAL
Qty: 90 TABLET | Refills: 0 | Status: SHIPPED | OUTPATIENT
Start: 2021-09-09 | End: 2021-12-10

## 2021-12-10 DIAGNOSIS — E03.9 HYPOTHYROIDISM, UNSPECIFIED TYPE: ICD-10-CM

## 2021-12-10 RX ORDER — LEVOTHYROXINE SODIUM 88 UG/1
TABLET ORAL
Qty: 90 TABLET | Refills: 0 | Status: SHIPPED | OUTPATIENT
Start: 2021-12-10

## 2022-03-12 RX ORDER — LEVOTHYROXINE SODIUM 88 UG/1
TABLET ORAL
Qty: 90 TABLET | Refills: 0 | Status: SHIPPED | OUTPATIENT
Start: 2022-03-12

## 2022-06-10 DIAGNOSIS — E03.9 HYPOTHYROIDISM, UNSPECIFIED TYPE: ICD-10-CM

## 2022-06-10 RX ORDER — LEVOTHYROXINE SODIUM 88 UG/1
TABLET ORAL
Qty: 90 TABLET | Refills: 0 | Status: SHIPPED | OUTPATIENT
Start: 2022-06-10

## 2022-06-20 ENCOUNTER — HOSPITAL ENCOUNTER (OUTPATIENT)
Age: 62
Discharge: HOME | End: 2022-06-20
Payer: COMMERCIAL

## 2022-06-20 ENCOUNTER — APPOINTMENT (OUTPATIENT)
Dept: GENERAL RADIOLOGY | Age: 62
End: 2022-06-20
Attending: NURSE PRACTITIONER
Payer: COMMERCIAL

## 2022-06-20 VITALS
HEART RATE: 87 BPM | SYSTOLIC BLOOD PRESSURE: 142 MMHG | DIASTOLIC BLOOD PRESSURE: 93 MMHG | TEMPERATURE: 98 F | RESPIRATION RATE: 18 BRPM | OXYGEN SATURATION: 95 %

## 2022-06-20 DIAGNOSIS — U07.1 COVID-19 VIRUS INFECTION: Primary | ICD-10-CM

## 2022-06-20 PROCEDURE — 99203 OFFICE O/P NEW LOW 30 MIN: CPT

## 2022-06-20 PROCEDURE — 71046 X-RAY EXAM CHEST 2 VIEWS: CPT | Performed by: NURSE PRACTITIONER

## 2022-06-20 PROCEDURE — 99213 OFFICE O/P EST LOW 20 MIN: CPT

## 2022-06-20 RX ORDER — ALBUTEROL SULFATE 90 UG/1
2 AEROSOL, METERED RESPIRATORY (INHALATION) EVERY 4 HOURS PRN
Qty: 18 G | Refills: 0 | Status: SHIPPED | OUTPATIENT
Start: 2022-06-20

## 2022-06-20 NOTE — ED INITIAL ASSESSMENT (HPI)
Patient reports she had a cold last week and the cough has not gotten better. Patient also complains of ear pain, and headache. Patient also reports swollen glands to her neck.

## 2022-07-05 ENCOUNTER — TELEPHONE (OUTPATIENT)
Dept: FAMILY MEDICINE CLINIC | Facility: CLINIC | Age: 62
End: 2022-07-05

## 2022-07-05 RX ORDER — BENZONATATE 200 MG/1
200 CAPSULE ORAL EVERY 8 HOURS PRN
Qty: 30 CAPSULE | Refills: 0 | Status: SHIPPED | OUTPATIENT
Start: 2022-07-05 | End: 2022-12-01 | Stop reason: ALTCHOICE

## 2022-07-05 NOTE — TELEPHONE ENCOUNTER
px'd benzonatate. Have her keep her apt. When did she start having covid symptoms? If its only been 5 days then do paxlovid.

## 2022-07-05 NOTE — TELEPHONE ENCOUNTER
called and stated that his wife had Covid. She tested negative 10 days ago. She still has a nagging cough. I got her on the schedule for next week as they only have 1 car. Would YP prescribe something for the cough?

## 2022-07-08 ENCOUNTER — TELEMEDICINE (OUTPATIENT)
Dept: FAMILY MEDICINE CLINIC | Facility: CLINIC | Age: 62
End: 2022-07-08
Payer: COMMERCIAL

## 2022-07-08 DIAGNOSIS — R49.1 LOST VOICE: ICD-10-CM

## 2022-07-08 DIAGNOSIS — U09.9 POST-COVID CHRONIC MUSCLE PAIN: ICD-10-CM

## 2022-07-08 DIAGNOSIS — R07.89 ATYPICAL CHEST PAIN: ICD-10-CM

## 2022-07-08 DIAGNOSIS — R05.3 POST-COVID CHRONIC COUGH: ICD-10-CM

## 2022-07-08 DIAGNOSIS — G89.29 POST-COVID CHRONIC MUSCLE PAIN: ICD-10-CM

## 2022-07-08 DIAGNOSIS — M79.10 POST-COVID CHRONIC MUSCLE PAIN: ICD-10-CM

## 2022-07-08 DIAGNOSIS — R05.3 POST-COVID CHRONIC COUGH: Primary | ICD-10-CM

## 2022-07-08 DIAGNOSIS — R06.02 SHORTNESS OF BREATH ON EXERTION: ICD-10-CM

## 2022-07-08 DIAGNOSIS — U09.9 POST-COVID CHRONIC COUGH: ICD-10-CM

## 2022-07-08 DIAGNOSIS — U09.9 POST-COVID CHRONIC COUGH: Primary | ICD-10-CM

## 2022-07-08 PROCEDURE — 99214 OFFICE O/P EST MOD 30 MIN: CPT | Performed by: FAMILY MEDICINE

## 2022-07-08 RX ORDER — OMEPRAZOLE 20 MG/1
CAPSULE, DELAYED RELEASE ORAL
Qty: 30 CAPSULE | Refills: 0 | Status: SHIPPED | OUTPATIENT
Start: 2022-07-08 | End: 2022-07-08

## 2022-07-08 RX ORDER — OMEPRAZOLE 20 MG/1
CAPSULE, DELAYED RELEASE ORAL
Qty: 117 CAPSULE | Refills: 0 | Status: SHIPPED | OUTPATIENT
Start: 2022-07-08

## 2022-07-14 ENCOUNTER — TELEPHONE (OUTPATIENT)
Dept: FAMILY MEDICINE CLINIC | Facility: CLINIC | Age: 62
End: 2022-07-14

## 2022-07-15 ENCOUNTER — EKG ENCOUNTER (OUTPATIENT)
Dept: LAB | Age: 62
End: 2022-07-15
Attending: FAMILY MEDICINE
Payer: COMMERCIAL

## 2022-07-15 DIAGNOSIS — R06.02 SHORTNESS OF BREATH ON EXERTION: ICD-10-CM

## 2022-07-15 DIAGNOSIS — R07.89 ATYPICAL CHEST PAIN: ICD-10-CM

## 2022-07-15 LAB
ATRIAL RATE: 75 BPM
P AXIS: 59 DEGREES
P-R INTERVAL: 124 MS
Q-T INTERVAL: 360 MS
QRS DURATION: 70 MS
QTC CALCULATION (BEZET): 402 MS
R AXIS: -27 DEGREES
T AXIS: 29 DEGREES
VENTRICULAR RATE: 75 BPM

## 2022-07-15 PROCEDURE — 93005 ELECTROCARDIOGRAM TRACING: CPT

## 2022-07-15 PROCEDURE — 93010 ELECTROCARDIOGRAM REPORT: CPT | Performed by: INTERNAL MEDICINE

## 2022-08-24 ENCOUNTER — TELEPHONE (OUTPATIENT)
Dept: PHYSICAL THERAPY | Facility: HOSPITAL | Age: 62
End: 2022-08-24

## 2022-08-29 ENCOUNTER — APPOINTMENT (OUTPATIENT)
Dept: PHYSICAL THERAPY | Age: 62
End: 2022-08-29
Attending: FAMILY MEDICINE
Payer: COMMERCIAL

## 2022-08-29 ENCOUNTER — TELEPHONE (OUTPATIENT)
Dept: PHYSICAL THERAPY | Age: 62
End: 2022-08-29

## 2022-08-31 ENCOUNTER — APPOINTMENT (OUTPATIENT)
Dept: PHYSICAL THERAPY | Age: 62
End: 2022-08-31
Attending: FAMILY MEDICINE
Payer: COMMERCIAL

## 2022-09-07 ENCOUNTER — APPOINTMENT (OUTPATIENT)
Dept: PHYSICAL THERAPY | Age: 62
End: 2022-09-07
Attending: FAMILY MEDICINE
Payer: COMMERCIAL

## 2022-09-12 ENCOUNTER — APPOINTMENT (OUTPATIENT)
Dept: PHYSICAL THERAPY | Age: 62
End: 2022-09-12
Attending: FAMILY MEDICINE
Payer: COMMERCIAL

## 2022-09-14 ENCOUNTER — APPOINTMENT (OUTPATIENT)
Dept: PHYSICAL THERAPY | Age: 62
End: 2022-09-14
Attending: FAMILY MEDICINE
Payer: COMMERCIAL

## 2022-09-15 DIAGNOSIS — E03.9 HYPOTHYROIDISM, UNSPECIFIED TYPE: ICD-10-CM

## 2022-09-16 DIAGNOSIS — E03.9 HYPOTHYROIDISM, UNSPECIFIED TYPE: ICD-10-CM

## 2022-09-16 RX ORDER — LEVOTHYROXINE SODIUM 88 UG/1
TABLET ORAL
Qty: 90 TABLET | Refills: 0 | OUTPATIENT
Start: 2022-09-16

## 2022-09-16 RX ORDER — LEVOTHYROXINE SODIUM 88 UG/1
TABLET ORAL
Qty: 30 TABLET | Refills: 0 | Status: SHIPPED | OUTPATIENT
Start: 2022-09-16 | End: 2022-10-22

## 2022-09-19 ENCOUNTER — APPOINTMENT (OUTPATIENT)
Dept: PHYSICAL THERAPY | Age: 62
End: 2022-09-19
Attending: FAMILY MEDICINE
Payer: COMMERCIAL

## 2022-09-21 ENCOUNTER — APPOINTMENT (OUTPATIENT)
Dept: PHYSICAL THERAPY | Age: 62
End: 2022-09-21
Attending: FAMILY MEDICINE
Payer: COMMERCIAL

## 2022-09-26 ENCOUNTER — APPOINTMENT (OUTPATIENT)
Dept: PHYSICAL THERAPY | Age: 62
End: 2022-09-26
Attending: FAMILY MEDICINE
Payer: COMMERCIAL

## 2022-10-18 DIAGNOSIS — E03.9 HYPOTHYROIDISM, UNSPECIFIED TYPE: ICD-10-CM

## 2022-10-22 RX ORDER — LEVOTHYROXINE SODIUM 88 UG/1
TABLET ORAL
Qty: 90 TABLET | Refills: 1 | Status: SHIPPED | OUTPATIENT
Start: 2022-10-22 | End: 2023-05-01

## 2022-11-02 ENCOUNTER — OFFICE VISIT (OUTPATIENT)
Dept: FAMILY MEDICINE CLINIC | Facility: CLINIC | Age: 62
End: 2022-11-02
Payer: COMMERCIAL

## 2022-11-02 ENCOUNTER — TELEPHONE (OUTPATIENT)
Dept: FAMILY MEDICINE CLINIC | Facility: CLINIC | Age: 62
End: 2022-11-02

## 2022-11-02 VITALS — HEART RATE: 80 BPM | DIASTOLIC BLOOD PRESSURE: 80 MMHG | SYSTOLIC BLOOD PRESSURE: 128 MMHG

## 2022-11-02 DIAGNOSIS — Z01.30 BLOOD PRESSURE CHECK: Primary | ICD-10-CM

## 2022-11-02 PROCEDURE — 3074F SYST BP LT 130 MM HG: CPT | Performed by: NURSE PRACTITIONER

## 2022-11-02 PROCEDURE — 3079F DIAST BP 80-89 MM HG: CPT | Performed by: NURSE PRACTITIONER

## 2022-11-02 NOTE — TELEPHONE ENCOUNTER
Spoke to p'ts  he is concerned his wife has elevated BP he states 140/99  He states wife has had elevated BP the last couple of days he states she is also c/o headache.  requesting appt today. Instructed  Dr Fanny Phillips not in office today and no availability in our office today. Instructed  to take wife to immediate care for evaluation. Understanding verbalized.

## 2022-11-02 NOTE — PROGRESS NOTES
Patient wanted BP checked. Had a headache this morning and checked her BP at 140/90. Denies headache now. Plans to follow up with PCP tomorrow. Advised to keep a log of BPs at random times of the day until visit.

## 2022-11-02 NOTE — TELEPHONE ENCOUNTER
Pt's bp is 138/99 -  wants to know if they should go to the IC since we have no appts today.   He wants to speak with nurse

## 2022-12-01 ENCOUNTER — OFFICE VISIT (OUTPATIENT)
Dept: FAMILY MEDICINE CLINIC | Facility: CLINIC | Age: 62
End: 2022-12-01
Payer: COMMERCIAL

## 2022-12-01 VITALS
DIASTOLIC BLOOD PRESSURE: 72 MMHG | SYSTOLIC BLOOD PRESSURE: 128 MMHG | WEIGHT: 142.38 LBS | HEART RATE: 101 BPM | RESPIRATION RATE: 16 BRPM | BODY MASS INDEX: 25.54 KG/M2 | OXYGEN SATURATION: 98 % | HEIGHT: 62.5 IN

## 2022-12-01 DIAGNOSIS — Z12.39 BREAST CANCER SCREENING OTHER THAN MAMMOGRAM: ICD-10-CM

## 2022-12-01 DIAGNOSIS — R10.84 GENERALIZED ABDOMINAL PAIN: ICD-10-CM

## 2022-12-01 DIAGNOSIS — R92.2 DENSE BREAST: ICD-10-CM

## 2022-12-01 DIAGNOSIS — M54.16 RIGHT LUMBAR RADICULOPATHY: ICD-10-CM

## 2022-12-01 DIAGNOSIS — Z00.00 ROUTINE GENERAL MEDICAL EXAMINATION AT A HEALTH CARE FACILITY: Primary | ICD-10-CM

## 2022-12-01 DIAGNOSIS — Z12.11 COLON CANCER SCREENING: ICD-10-CM

## 2022-12-01 DIAGNOSIS — R23.8 SCALP IRRITATION: ICD-10-CM

## 2022-12-01 PROCEDURE — 3074F SYST BP LT 130 MM HG: CPT | Performed by: PHYSICIAN ASSISTANT

## 2022-12-01 PROCEDURE — 3008F BODY MASS INDEX DOCD: CPT | Performed by: PHYSICIAN ASSISTANT

## 2022-12-01 PROCEDURE — 3078F DIAST BP <80 MM HG: CPT | Performed by: PHYSICIAN ASSISTANT

## 2022-12-01 PROCEDURE — 99396 PREV VISIT EST AGE 40-64: CPT | Performed by: PHYSICIAN ASSISTANT

## 2022-12-01 RX ORDER — KETOCONAZOLE 20 MG/ML
SHAMPOO TOPICAL
Qty: 120 ML | Refills: 1 | Status: SHIPPED | OUTPATIENT
Start: 2022-12-01

## 2022-12-10 ENCOUNTER — LAB ENCOUNTER (OUTPATIENT)
Dept: LAB | Age: 62
End: 2022-12-10
Attending: PHYSICIAN ASSISTANT
Payer: COMMERCIAL

## 2022-12-10 DIAGNOSIS — Z00.00 ROUTINE GENERAL MEDICAL EXAMINATION AT A HEALTH CARE FACILITY: ICD-10-CM

## 2022-12-10 LAB
ALBUMIN SERPL-MCNC: 3.7 G/DL (ref 3.4–5)
ALBUMIN/GLOB SERPL: 0.9 {RATIO} (ref 1–2)
ALP LIVER SERPL-CCNC: 87 U/L
ALT SERPL-CCNC: 31 U/L
ANION GAP SERPL CALC-SCNC: 6 MMOL/L (ref 0–18)
AST SERPL-CCNC: 21 U/L (ref 15–37)
BASOPHILS # BLD AUTO: 0.09 X10(3) UL (ref 0–0.2)
BASOPHILS NFR BLD AUTO: 0.9 %
BILIRUB SERPL-MCNC: 0.4 MG/DL (ref 0.1–2)
BUN BLD-MCNC: 16 MG/DL (ref 7–18)
CALCIUM BLD-MCNC: 9.3 MG/DL (ref 8.5–10.1)
CHLORIDE SERPL-SCNC: 106 MMOL/L (ref 98–112)
CHOLEST SERPL-MCNC: 310 MG/DL (ref ?–200)
CO2 SERPL-SCNC: 26 MMOL/L (ref 21–32)
CREAT BLD-MCNC: 0.79 MG/DL
DEPRECATED HBV CORE AB SER IA-ACNC: 76 NG/ML
EOSINOPHIL # BLD AUTO: 0.14 X10(3) UL (ref 0–0.7)
EOSINOPHIL NFR BLD AUTO: 1.4 %
ERYTHROCYTE [DISTWIDTH] IN BLOOD BY AUTOMATED COUNT: 12 %
EST. AVERAGE GLUCOSE BLD GHB EST-MCNC: 128 MG/DL (ref 68–126)
FASTING PATIENT LIPID ANSWER: YES
FASTING STATUS PATIENT QL REPORTED: YES
GFR SERPLBLD BASED ON 1.73 SQ M-ARVRAT: 86 ML/MIN/1.73M2 (ref 60–?)
GLOBULIN PLAS-MCNC: 3.9 G/DL (ref 2.8–4.4)
GLUCOSE BLD-MCNC: 139 MG/DL (ref 70–99)
HBA1C MFR BLD: 6.1 % (ref ?–5.7)
HCT VFR BLD AUTO: 42.9 %
HDLC SERPL-MCNC: 38 MG/DL (ref 40–59)
HGB BLD-MCNC: 14.3 G/DL
IMM GRANULOCYTES # BLD AUTO: 0.03 X10(3) UL (ref 0–1)
IMM GRANULOCYTES NFR BLD: 0.3 %
IRON SATN MFR SERPL: 23 %
IRON SERPL-MCNC: 80 UG/DL
LDLC SERPL CALC-MCNC: 187 MG/DL (ref ?–100)
LYMPHOCYTES # BLD AUTO: 3.4 X10(3) UL (ref 1–4)
LYMPHOCYTES NFR BLD AUTO: 33.6 %
MCH RBC QN AUTO: 31.5 PG (ref 26–34)
MCHC RBC AUTO-ENTMCNC: 33.3 G/DL (ref 31–37)
MCV RBC AUTO: 94.5 FL
MONOCYTES # BLD AUTO: 0.56 X10(3) UL (ref 0.1–1)
MONOCYTES NFR BLD AUTO: 5.5 %
NEUTROPHILS # BLD AUTO: 5.89 X10 (3) UL (ref 1.5–7.7)
NEUTROPHILS # BLD AUTO: 5.89 X10(3) UL (ref 1.5–7.7)
NEUTROPHILS NFR BLD AUTO: 58.3 %
NONHDLC SERPL-MCNC: 272 MG/DL (ref ?–130)
OSMOLALITY SERPL CALC.SUM OF ELEC: 289 MOSM/KG (ref 275–295)
PLATELET # BLD AUTO: 388 10(3)UL (ref 150–450)
POTASSIUM SERPL-SCNC: 4.2 MMOL/L (ref 3.5–5.1)
PROT SERPL-MCNC: 7.6 G/DL (ref 6.4–8.2)
RBC # BLD AUTO: 4.54 X10(6)UL
SODIUM SERPL-SCNC: 138 MMOL/L (ref 136–145)
T4 FREE SERPL-MCNC: 1.2 NG/DL (ref 0.8–1.7)
TIBC SERPL-MCNC: 343 UG/DL (ref 240–450)
TRANSFERRIN SERPL-MCNC: 230 MG/DL (ref 200–360)
TRIGL SERPL-MCNC: 419 MG/DL (ref 30–149)
TSI SER-ACNC: 4.55 MIU/ML (ref 0.36–3.74)
VIT B12 SERPL-MCNC: 299 PG/ML (ref 193–986)
VIT D+METAB SERPL-MCNC: 18.1 NG/ML (ref 30–100)
VLDLC SERPL CALC-MCNC: 91 MG/DL (ref 0–30)
WBC # BLD AUTO: 10.1 X10(3) UL (ref 4–11)

## 2022-12-10 PROCEDURE — 80050 GENERAL HEALTH PANEL: CPT | Performed by: PHYSICIAN ASSISTANT

## 2022-12-10 PROCEDURE — 80061 LIPID PANEL: CPT | Performed by: PHYSICIAN ASSISTANT

## 2022-12-10 PROCEDURE — 82306 VITAMIN D 25 HYDROXY: CPT | Performed by: PHYSICIAN ASSISTANT

## 2022-12-10 PROCEDURE — 82728 ASSAY OF FERRITIN: CPT | Performed by: PHYSICIAN ASSISTANT

## 2022-12-10 PROCEDURE — 83540 ASSAY OF IRON: CPT | Performed by: PHYSICIAN ASSISTANT

## 2022-12-10 PROCEDURE — 83550 IRON BINDING TEST: CPT | Performed by: PHYSICIAN ASSISTANT

## 2022-12-10 PROCEDURE — 82607 VITAMIN B-12: CPT | Performed by: PHYSICIAN ASSISTANT

## 2022-12-10 PROCEDURE — 83036 HEMOGLOBIN GLYCOSYLATED A1C: CPT | Performed by: PHYSICIAN ASSISTANT

## 2022-12-10 PROCEDURE — 84439 ASSAY OF FREE THYROXINE: CPT | Performed by: PHYSICIAN ASSISTANT

## 2023-02-23 ENCOUNTER — OFFICE VISIT (OUTPATIENT)
Dept: FAMILY MEDICINE CLINIC | Facility: CLINIC | Age: 63
End: 2023-02-23
Payer: COMMERCIAL

## 2023-02-23 VITALS
DIASTOLIC BLOOD PRESSURE: 80 MMHG | SYSTOLIC BLOOD PRESSURE: 124 MMHG | BODY MASS INDEX: 25.48 KG/M2 | OXYGEN SATURATION: 98 % | HEART RATE: 96 BPM | WEIGHT: 142 LBS | HEIGHT: 62.5 IN

## 2023-02-23 DIAGNOSIS — K21.00 GASTROESOPHAGEAL REFLUX DISEASE WITH ESOPHAGITIS WITHOUT HEMORRHAGE: ICD-10-CM

## 2023-02-23 DIAGNOSIS — L65.9 HAIR LOSS: ICD-10-CM

## 2023-02-23 DIAGNOSIS — J06.9 VIRAL UPPER RESPIRATORY TRACT INFECTION: Primary | ICD-10-CM

## 2023-02-23 PROCEDURE — 99214 OFFICE O/P EST MOD 30 MIN: CPT | Performed by: FAMILY MEDICINE

## 2023-02-23 PROCEDURE — 3079F DIAST BP 80-89 MM HG: CPT | Performed by: FAMILY MEDICINE

## 2023-02-23 PROCEDURE — 3008F BODY MASS INDEX DOCD: CPT | Performed by: FAMILY MEDICINE

## 2023-02-23 PROCEDURE — 3074F SYST BP LT 130 MM HG: CPT | Performed by: FAMILY MEDICINE

## 2023-04-29 DIAGNOSIS — E03.9 HYPOTHYROIDISM, UNSPECIFIED TYPE: ICD-10-CM

## 2023-05-01 RX ORDER — LEVOTHYROXINE SODIUM 88 UG/1
TABLET ORAL
Qty: 90 TABLET | Refills: 1 | Status: SHIPPED | OUTPATIENT
Start: 2023-05-01

## 2023-06-19 ENCOUNTER — OFFICE VISIT (OUTPATIENT)
Dept: FAMILY MEDICINE CLINIC | Facility: CLINIC | Age: 63
End: 2023-06-19
Payer: COMMERCIAL

## 2023-06-19 VITALS
OXYGEN SATURATION: 97 % | HEIGHT: 62.5 IN | RESPIRATION RATE: 16 BRPM | WEIGHT: 141 LBS | BODY MASS INDEX: 25.3 KG/M2 | SYSTOLIC BLOOD PRESSURE: 122 MMHG | HEART RATE: 76 BPM | DIASTOLIC BLOOD PRESSURE: 72 MMHG

## 2023-06-19 DIAGNOSIS — R21 SKIN RASH: ICD-10-CM

## 2023-06-19 DIAGNOSIS — R14.0 BLOATING: Primary | ICD-10-CM

## 2023-06-19 DIAGNOSIS — E78.5 HYPERLIPIDEMIA, UNSPECIFIED HYPERLIPIDEMIA TYPE: ICD-10-CM

## 2023-06-19 DIAGNOSIS — M54.16 LUMBAR RADICULOPATHY: ICD-10-CM

## 2023-06-19 PROCEDURE — 99214 OFFICE O/P EST MOD 30 MIN: CPT | Performed by: FAMILY MEDICINE

## 2023-06-19 PROCEDURE — 3074F SYST BP LT 130 MM HG: CPT | Performed by: FAMILY MEDICINE

## 2023-06-19 PROCEDURE — 3078F DIAST BP <80 MM HG: CPT | Performed by: FAMILY MEDICINE

## 2023-06-19 PROCEDURE — 3008F BODY MASS INDEX DOCD: CPT | Performed by: FAMILY MEDICINE

## 2023-06-19 RX ORDER — TRIAMCINOLONE ACETONIDE 1 MG/G
CREAM TOPICAL 2 TIMES DAILY PRN
Qty: 1 EACH | Refills: 1 | Status: SHIPPED | OUTPATIENT
Start: 2023-06-19

## 2023-06-19 NOTE — PATIENT INSTRUCTIONS
1. Bloating  Do probiotics daily x 2 months  Try FODMAP diet    2. Lumbar radiculopathy  - OP REFERRAL TO [facility]    3. Skin rash  - DERM - INTERNAL    4.  Hyperlipidemia, unspecified hyperlipidemia type  - CARDIO - INTERNAL

## 2023-11-06 DIAGNOSIS — E03.9 HYPOTHYROIDISM, UNSPECIFIED TYPE: ICD-10-CM

## 2023-11-07 RX ORDER — LEVOTHYROXINE SODIUM 88 UG/1
TABLET ORAL
Qty: 90 TABLET | Refills: 1 | Status: SHIPPED | OUTPATIENT
Start: 2023-11-07 | End: 2023-11-10

## 2023-11-10 ENCOUNTER — OFFICE VISIT (OUTPATIENT)
Dept: FAMILY MEDICINE CLINIC | Facility: CLINIC | Age: 63
End: 2023-11-10
Payer: COMMERCIAL

## 2023-11-10 ENCOUNTER — TELEPHONE (OUTPATIENT)
Dept: FAMILY MEDICINE CLINIC | Facility: CLINIC | Age: 63
End: 2023-11-10

## 2023-11-10 VITALS
HEIGHT: 62.5 IN | DIASTOLIC BLOOD PRESSURE: 76 MMHG | RESPIRATION RATE: 16 BRPM | OXYGEN SATURATION: 98 % | BODY MASS INDEX: 25.12 KG/M2 | SYSTOLIC BLOOD PRESSURE: 126 MMHG | HEART RATE: 87 BPM | WEIGHT: 140 LBS

## 2023-11-10 DIAGNOSIS — R05.2 SUBACUTE COUGH: ICD-10-CM

## 2023-11-10 DIAGNOSIS — R10.13 EPIGASTRIC ABDOMINAL PAIN: ICD-10-CM

## 2023-11-10 DIAGNOSIS — R73.03 PREDIABETES: ICD-10-CM

## 2023-11-10 DIAGNOSIS — E03.9 HYPOTHYROIDISM, UNSPECIFIED TYPE: Primary | ICD-10-CM

## 2023-11-10 DIAGNOSIS — E53.8 B12 DEFICIENCY: ICD-10-CM

## 2023-11-10 DIAGNOSIS — Z00.00 LABORATORY EXAMINATION ORDERED AS PART OF A COMPLETE PHYSICAL EXAMINATION: ICD-10-CM

## 2023-11-10 DIAGNOSIS — E55.9 VITAMIN D DEFICIENCY: ICD-10-CM

## 2023-11-10 DIAGNOSIS — E78.5 HYPERLIPIDEMIA, UNSPECIFIED HYPERLIPIDEMIA TYPE: ICD-10-CM

## 2023-11-10 PROCEDURE — 3074F SYST BP LT 130 MM HG: CPT | Performed by: FAMILY MEDICINE

## 2023-11-10 PROCEDURE — 3008F BODY MASS INDEX DOCD: CPT | Performed by: FAMILY MEDICINE

## 2023-11-10 PROCEDURE — 3078F DIAST BP <80 MM HG: CPT | Performed by: FAMILY MEDICINE

## 2023-11-10 PROCEDURE — 99214 OFFICE O/P EST MOD 30 MIN: CPT | Performed by: FAMILY MEDICINE

## 2023-11-10 RX ORDER — LEVOTHYROXINE SODIUM 88 UG/1
88 TABLET ORAL
Qty: 90 TABLET | Refills: 3 | Status: SHIPPED | OUTPATIENT
Start: 2023-11-10

## 2023-11-10 RX ORDER — AZITHROMYCIN 250 MG/1
TABLET, FILM COATED ORAL
Qty: 6 TABLET | Refills: 0 | Status: SHIPPED | OUTPATIENT
Start: 2023-11-10 | End: 2023-11-14

## 2023-11-16 NOTE — PROGRESS NOTES
Subjective:   Patient ID: Mariel Patrick is a 66year old female. 1. Hypothyroidism, unspecified type. Chronic.  + fatigue despite dose changes.      + HLD. Chronic. No CP/SOB. 3. Prediabetes. Last A1c value was 6.1% done 12/10/2022.     + vit D def. Not taking vit D. No fractures. Mild fatigue.    + b12 def. Chronic. Not on b12.      7. Epigastric abdominal pain. Chronic intermittent. Worried about pylori infection. 8. Subacute cough. Chronic. No F/C. No CP. History/Other:   Review of Systems   All other systems reviewed and are negative. Current Outpatient Medications   Medication Sig Dispense Refill    levothyroxine 88 MCG Oral Tab Take 1 tablet (88 mcg total) by mouth before breakfast. 90 tablet 3    triamcinolone 0.1 % External Cream Apply topically 2 (two) times daily as needed. Up to 5 , then 1 week break before restarting this cycle as needed. (Patient not taking: Reported on 11/10/2023) 1 each 1     Allergies: Allergies   Allergen Reactions    Ezetimibe MYALGIA    Statins MYALGIA    Erythromycin NAUSEA ONLY       Objective:   Physical Exam  Vitals reviewed. Constitutional:       General: She is not in acute distress. Appearance: She is well-developed. She is not diaphoretic. Eyes:      General:         Right eye: No discharge. Left eye: No discharge. Conjunctiva/sclera: Conjunctivae normal.   Cardiovascular:      Rate and Rhythm: Normal rate and regular rhythm. Heart sounds: Normal heart sounds. No murmur heard. Pulmonary:      Effort: Pulmonary effort is normal. No respiratory distress. Breath sounds: Normal breath sounds. No wheezing or rales. Abdominal:      Comments: + mild epigastric abd pain. Assessment & Plan:   1. Hypothyroidism, unspecified type    2. Hyperlipidemia, unspecified hyperlipidemia type    3. Prediabetes    4. Vitamin D deficiency    5. B12 deficiency    6.  Laboratory examination ordered as part of a complete physical examination    7. Epigastric abdominal pain    8. Subacute cough      1. Hypothyroidism, unspecified type  - TSH W Reflex To Free T4; Future  - levothyroxine 88 MCG Oral Tab; Take 1 tablet (88 mcg total) by mouth before breakfast.  Dispense: 90 tablet; Refill: 3    2. Hyperlipidemia, unspecified hyperlipidemia type  - Lipid Panel; Future    3. Prediabetes  - Hemoglobin A1C; Future    4. Vitamin D deficiency  - Vitamin D [E]; Future    5. B12 deficiency  - Vitamin B12 [E]; Future    6. Laboratory examination ordered as part of a complete physical examination  - Comp Metabolic Panel (14); Future  - CBC W Differential W Platelet [E]; Future    7. Epigastric abdominal pain  - HELICOBACTER PYLORI, IGG [70071] [Q]    8. Subacute cough  - azithromycin 250 MG Oral Tab; Take 2 tablets (500 mg total) by mouth daily for 1 day, THEN 1 tablet (250 mg total) daily for 4 days. Dispense: 6 tablet; Refill: 0  - XR CHEST PA + LAT CHEST (CPT=71046); Future    Orders Placed This Encounter   Procedures    Lipid Panel    Comp Metabolic Panel (14)    TSH W Reflex To Free T4    Hemoglobin A1C    Vitamin B12 [E]    Vitamin D [E]    CBC W Differential W Platelet [E]    HELICOBACTER PYLORI, IGG [51466] [Q]       Meds This Visit:  Requested Prescriptions     Signed Prescriptions Disp Refills    azithromycin 250 MG Oral Tab 6 tablet 0     Sig: Take 2 tablets (500 mg total) by mouth daily for 1 day, THEN 1 tablet (250 mg total) daily for 4 days.     levothyroxine 88 MCG Oral Tab 90 tablet 3     Sig: Take 1 tablet (88 mcg total) by mouth before breakfast.       Imaging & Referrals:  XR CHEST PA + LAT CHEST (CPT=71046)

## 2023-12-02 ENCOUNTER — LAB ENCOUNTER (OUTPATIENT)
Dept: LAB | Facility: HOSPITAL | Age: 63
End: 2023-12-02
Attending: FAMILY MEDICINE
Payer: COMMERCIAL

## 2023-12-02 ENCOUNTER — HOSPITAL ENCOUNTER (OUTPATIENT)
Dept: GENERAL RADIOLOGY | Facility: HOSPITAL | Age: 63
Discharge: HOME | End: 2023-12-02
Attending: FAMILY MEDICINE
Payer: COMMERCIAL

## 2023-12-02 DIAGNOSIS — R05.2 SUBACUTE COUGH: ICD-10-CM

## 2023-12-02 DIAGNOSIS — E53.8 B12 DEFICIENCY: ICD-10-CM

## 2023-12-02 DIAGNOSIS — E78.5 HYPERLIPIDEMIA, UNSPECIFIED HYPERLIPIDEMIA TYPE: ICD-10-CM

## 2023-12-02 DIAGNOSIS — E03.9 HYPOTHYROIDISM, UNSPECIFIED TYPE: ICD-10-CM

## 2023-12-02 DIAGNOSIS — E55.9 VITAMIN D DEFICIENCY: ICD-10-CM

## 2023-12-02 DIAGNOSIS — Z00.00 LABORATORY EXAMINATION ORDERED AS PART OF A COMPLETE PHYSICAL EXAMINATION: ICD-10-CM

## 2023-12-02 DIAGNOSIS — R73.03 PREDIABETES: ICD-10-CM

## 2023-12-02 LAB
ALBUMIN SERPL-MCNC: 3.8 G/DL (ref 3.4–5)
ALBUMIN/GLOB SERPL: 0.9 {RATIO} (ref 1–2)
ALP LIVER SERPL-CCNC: 81 U/L
ALT SERPL-CCNC: 26 U/L
ANION GAP SERPL CALC-SCNC: 8 MMOL/L (ref 0–18)
AST SERPL-CCNC: 12 U/L (ref 15–37)
BASOPHILS # BLD AUTO: 0.11 X10(3) UL (ref 0–0.2)
BASOPHILS NFR BLD AUTO: 1.2 %
BILIRUB SERPL-MCNC: 0.5 MG/DL (ref 0.1–2)
BUN BLD-MCNC: 12 MG/DL (ref 9–23)
CALCIUM BLD-MCNC: 9.3 MG/DL (ref 8.5–10.1)
CHLORIDE SERPL-SCNC: 106 MMOL/L (ref 98–112)
CHOLEST SERPL-MCNC: 314 MG/DL (ref ?–200)
CO2 SERPL-SCNC: 23 MMOL/L (ref 21–32)
CREAT BLD-MCNC: 0.92 MG/DL
EGFRCR SERPLBLD CKD-EPI 2021: 71 ML/MIN/1.73M2 (ref 60–?)
EOSINOPHIL # BLD AUTO: 0.16 X10(3) UL (ref 0–0.7)
EOSINOPHIL NFR BLD AUTO: 1.8 %
ERYTHROCYTE [DISTWIDTH] IN BLOOD BY AUTOMATED COUNT: 11.7 %
EST. AVERAGE GLUCOSE BLD GHB EST-MCNC: 134 MG/DL (ref 68–126)
FASTING PATIENT LIPID ANSWER: YES
FASTING STATUS PATIENT QL REPORTED: YES
GLOBULIN PLAS-MCNC: 4.4 G/DL (ref 2.8–4.4)
GLUCOSE BLD-MCNC: 118 MG/DL (ref 70–99)
HBA1C MFR BLD: 6.3 % (ref ?–5.7)
HCT VFR BLD AUTO: 42.4 %
HDLC SERPL-MCNC: 40 MG/DL (ref 40–59)
HGB BLD-MCNC: 14.7 G/DL
IMM GRANULOCYTES # BLD AUTO: 0.03 X10(3) UL (ref 0–1)
IMM GRANULOCYTES NFR BLD: 0.3 %
LDLC SERPL CALC-MCNC: 209 MG/DL (ref ?–100)
LYMPHOCYTES # BLD AUTO: 3.01 X10(3) UL (ref 1–4)
LYMPHOCYTES NFR BLD AUTO: 33.5 %
MCH RBC QN AUTO: 30.8 PG (ref 26–34)
MCHC RBC AUTO-ENTMCNC: 34.7 G/DL (ref 31–37)
MCV RBC AUTO: 88.7 FL
MONOCYTES # BLD AUTO: 0.49 X10(3) UL (ref 0.1–1)
MONOCYTES NFR BLD AUTO: 5.5 %
NEUTROPHILS # BLD AUTO: 5.19 X10 (3) UL (ref 1.5–7.7)
NEUTROPHILS # BLD AUTO: 5.19 X10(3) UL (ref 1.5–7.7)
NEUTROPHILS NFR BLD AUTO: 57.7 %
NONHDLC SERPL-MCNC: 274 MG/DL (ref ?–130)
OSMOLALITY SERPL CALC.SUM OF ELEC: 285 MOSM/KG (ref 275–295)
PLATELET # BLD AUTO: 469 10(3)UL (ref 150–450)
POTASSIUM SERPL-SCNC: 3.8 MMOL/L (ref 3.5–5.1)
PROT SERPL-MCNC: 8.2 G/DL (ref 6.4–8.2)
RBC # BLD AUTO: 4.78 X10(6)UL
SODIUM SERPL-SCNC: 137 MMOL/L (ref 136–145)
TRIGL SERPL-MCNC: 319 MG/DL (ref 30–149)
TSI SER-ACNC: 2.96 MIU/ML (ref 0.36–3.74)
VIT B12 SERPL-MCNC: 282 PG/ML (ref 193–986)
VIT D+METAB SERPL-MCNC: 16.4 NG/ML (ref 30–100)
VLDLC SERPL CALC-MCNC: 72 MG/DL (ref 0–30)
WBC # BLD AUTO: 9 X10(3) UL (ref 4–11)

## 2023-12-02 PROCEDURE — 80061 LIPID PANEL: CPT

## 2023-12-02 PROCEDURE — 36415 COLL VENOUS BLD VENIPUNCTURE: CPT

## 2023-12-02 PROCEDURE — 83036 HEMOGLOBIN GLYCOSYLATED A1C: CPT

## 2023-12-02 PROCEDURE — 71046 X-RAY EXAM CHEST 2 VIEWS: CPT | Performed by: FAMILY MEDICINE

## 2023-12-02 PROCEDURE — 82607 VITAMIN B-12: CPT

## 2023-12-02 PROCEDURE — 82306 VITAMIN D 25 HYDROXY: CPT

## 2023-12-02 PROCEDURE — 84443 ASSAY THYROID STIM HORMONE: CPT

## 2023-12-02 PROCEDURE — 80053 COMPREHEN METABOLIC PANEL: CPT

## 2023-12-02 PROCEDURE — 85025 COMPLETE CBC W/AUTO DIFF WBC: CPT

## 2023-12-21 ENCOUNTER — TELEMEDICINE (OUTPATIENT)
Dept: FAMILY MEDICINE CLINIC | Facility: CLINIC | Age: 63
End: 2023-12-21
Payer: COMMERCIAL

## 2023-12-21 DIAGNOSIS — E55.9 VITAMIN D DEFICIENCY: ICD-10-CM

## 2023-12-21 DIAGNOSIS — R79.89 HIGH PLATELET COUNT: ICD-10-CM

## 2023-12-21 DIAGNOSIS — E78.2 MIXED HYPERLIPIDEMIA: Primary | ICD-10-CM

## 2023-12-21 PROCEDURE — 99214 OFFICE O/P EST MOD 30 MIN: CPT | Performed by: FAMILY MEDICINE

## 2023-12-21 RX ORDER — FENOFIBRATE 67 MG/1
1 CAPSULE ORAL DAILY
Qty: 90 CAPSULE | Refills: 1 | Status: SHIPPED | OUTPATIENT
Start: 2024-01-11

## 2023-12-21 RX ORDER — SIMVASTATIN 10 MG
10 TABLET ORAL DAILY
Qty: 90 TABLET | Refills: 3 | Status: SHIPPED | OUTPATIENT
Start: 2023-12-21 | End: 2024-12-15

## 2023-12-27 ENCOUNTER — TELEPHONE (OUTPATIENT)
Dept: FAMILY MEDICINE CLINIC | Facility: CLINIC | Age: 63
End: 2023-12-27

## 2023-12-27 NOTE — TELEPHONE ENCOUNTER
Pharmacist called back:  Fenofibrate: pt has gall bladder problems, is doctor aware, will this be monitored?    YP, statins noted on allergy list with myalgia.   Please see message above from pharmacist.

## 2023-12-28 NOTE — TELEPHONE ENCOUNTER
Left detailed message for pharmacist in regard to both statin and fenofibrate as per Dr Toledo's instruction.

## 2023-12-28 NOTE — TELEPHONE ENCOUNTER
I am aware of that statin and fenofibrate. We will monitor both.  Ok for pt to take at this time.  Benefits outway risk

## 2023-12-28 NOTE — PROGRESS NOTES
Subjective:   Patient ID: Schuyler Rizo is a 66year old female. Mixed hyperlipidemia. Chronic. Trouble handling statin in the past.  No CP/SOB. + vit D def. Not taking vit D. No fractures. Mild fatigue. Blood test showed high platelets. No bone pain. History/Other:   Review of Systems   All other systems reviewed and are negative. Current Outpatient Medications   Medication Sig Dispense Refill    simvastatin 10 MG Oral Tab Take 1 tablet (10 mg total) by mouth daily. 90 tablet 3    [START ON 1/11/2024] Fenofibrate 67 MG Oral Cap Take 1 tablet by mouth daily. 90 capsule 1    ergocalciferol 1.25 MG (64184 UT) Oral Cap Take 1 capsule (50,000 Units total) by mouth once a week. Once weekly x 20 weeks. Take with food 20 capsule 0    levothyroxine 88 MCG Oral Tab Take 1 tablet (88 mcg total) by mouth before breakfast. 90 tablet 3    triamcinolone 0.1 % External Cream Apply topically 2 (two) times daily as needed. Up to 5 , then 1 week break before restarting this cycle as needed. (Patient not taking: Reported on 11/10/2023) 1 each 1     Allergies: Allergies   Allergen Reactions    Ezetimibe MYALGIA    Statins MYALGIA    Erythromycin NAUSEA ONLY       Objective:   Physical Exam  Video visit    Assessment & Plan:   1. Mixed hyperlipidemia    2. Vitamin D deficiency    3. High platelet count      Video visit    1. Mixed hyperlipidemia  - simvastatin 10 MG Oral Tab; Take 1 tablet (10 mg total) by mouth daily. Dispense: 90 tablet; Refill: 3  - Fenofibrate 67 MG Oral Cap; Take 1 tablet by mouth daily. Dispense: 90 capsule; Refill: 1  - Comp Metabolic Panel (14) [E]; Future  - Lipid Panel [E]; Future    2. Vitamin D deficiency  Vit D prescribe. 3. High platelet count  Check CBC.     Orders Placed This Encounter   Procedures    Comp Metabolic Panel (14) [E]    Lipid Panel [E]       Meds This Visit:  Requested Prescriptions     Signed Prescriptions Disp Refills    simvastatin 10 MG Oral Tab 90 tablet 3 Sig: Take 1 tablet (10 mg total) by mouth daily. Fenofibrate 67 MG Oral Cap 90 capsule 1     Sig: Take 1 tablet by mouth daily.        Imaging & Referrals:  None

## 2024-12-06 DIAGNOSIS — E03.9 HYPOTHYROIDISM, UNSPECIFIED TYPE: ICD-10-CM

## 2024-12-06 NOTE — TELEPHONE ENCOUNTER
A refill request was received for:  Requested Prescriptions     Pending Prescriptions Disp Refills    LEVOTHYROXINE 88 MCG Oral Tab [Pharmacy Med Name: LEVOTHYROXINE 0.088MG (88MCG) TAB] 90 tablet 3     Sig: TAKE 1 TABLET(88 MCG) BY MOUTH BEFORE BREAKFAST       Last refill date:  11/10/23     Last office visit: 11/10/23      No future appointments.

## 2024-12-10 RX ORDER — LEVOTHYROXINE SODIUM 88 UG/1
88 TABLET ORAL
Qty: 90 TABLET | Refills: 0 | Status: SHIPPED | OUTPATIENT
Start: 2024-12-10

## 2025-01-18 ENCOUNTER — HOSPITAL ENCOUNTER (EMERGENCY)
Facility: HOSPITAL | Age: 65
Discharge: HOME | End: 2025-01-19
Attending: EMERGENCY MEDICINE
Payer: COMMERCIAL

## 2025-01-18 DIAGNOSIS — R51.9 ACUTE NONINTRACTABLE HEADACHE, UNSPECIFIED HEADACHE TYPE: Primary | ICD-10-CM

## 2025-01-18 DIAGNOSIS — R03.0 ELEVATED BLOOD PRESSURE READING: ICD-10-CM

## 2025-01-18 LAB
BASOPHILS # BLD AUTO: 0.08 X10(3) UL (ref 0–0.2)
BASOPHILS NFR BLD AUTO: 0.8 %
EOSINOPHIL # BLD AUTO: 0.07 X10(3) UL (ref 0–0.7)
EOSINOPHIL NFR BLD AUTO: 0.7 %
ERYTHROCYTE [DISTWIDTH] IN BLOOD BY AUTOMATED COUNT: 11.7 %
HCT VFR BLD AUTO: 40.7 %
HGB BLD-MCNC: 14.4 G/DL
IMM GRANULOCYTES # BLD AUTO: 0.05 X10(3) UL (ref 0–1)
IMM GRANULOCYTES NFR BLD: 0.5 %
LYMPHOCYTES # BLD AUTO: 3.34 X10(3) UL (ref 1–4)
LYMPHOCYTES NFR BLD AUTO: 31.8 %
MCH RBC QN AUTO: 31.4 PG (ref 26–34)
MCHC RBC AUTO-ENTMCNC: 35.4 G/DL (ref 31–37)
MCV RBC AUTO: 88.7 FL
MONOCYTES # BLD AUTO: 0.65 X10(3) UL (ref 0.1–1)
MONOCYTES NFR BLD AUTO: 6.2 %
NEUTROPHILS # BLD AUTO: 6.3 X10 (3) UL (ref 1.5–7.7)
NEUTROPHILS # BLD AUTO: 6.3 X10(3) UL (ref 1.5–7.7)
NEUTROPHILS NFR BLD AUTO: 60 %
PLATELET # BLD AUTO: 368 10(3)UL (ref 150–450)
RBC # BLD AUTO: 4.59 X10(6)UL
WBC # BLD AUTO: 10.5 X10(3) UL (ref 4–11)

## 2025-01-18 PROCEDURE — 85025 COMPLETE CBC W/AUTO DIFF WBC: CPT | Performed by: EMERGENCY MEDICINE

## 2025-01-18 PROCEDURE — 99284 EMERGENCY DEPT VISIT MOD MDM: CPT

## 2025-01-18 PROCEDURE — 80053 COMPREHEN METABOLIC PANEL: CPT | Performed by: EMERGENCY MEDICINE

## 2025-01-18 PROCEDURE — 99285 EMERGENCY DEPT VISIT HI MDM: CPT

## 2025-01-18 PROCEDURE — 36415 COLL VENOUS BLD VENIPUNCTURE: CPT

## 2025-01-18 PROCEDURE — 93010 ELECTROCARDIOGRAM REPORT: CPT

## 2025-01-18 PROCEDURE — 84484 ASSAY OF TROPONIN QUANT: CPT | Performed by: EMERGENCY MEDICINE

## 2025-01-19 ENCOUNTER — APPOINTMENT (OUTPATIENT)
Dept: CT IMAGING | Facility: HOSPITAL | Age: 65
End: 2025-01-19
Attending: EMERGENCY MEDICINE
Payer: COMMERCIAL

## 2025-01-19 ENCOUNTER — APPOINTMENT (OUTPATIENT)
Dept: GENERAL RADIOLOGY | Facility: HOSPITAL | Age: 65
End: 2025-01-19
Attending: EMERGENCY MEDICINE
Payer: COMMERCIAL

## 2025-01-19 VITALS
WEIGHT: 145 LBS | HEART RATE: 69 BPM | SYSTOLIC BLOOD PRESSURE: 136 MMHG | OXYGEN SATURATION: 100 % | RESPIRATION RATE: 17 BRPM | BODY MASS INDEX: 26.68 KG/M2 | HEIGHT: 62 IN | TEMPERATURE: 97 F | DIASTOLIC BLOOD PRESSURE: 93 MMHG

## 2025-01-19 LAB
ALBUMIN SERPL-MCNC: 4.2 G/DL (ref 3.2–4.8)
ALBUMIN/GLOB SERPL: 1.2 {RATIO} (ref 1–2)
ALP LIVER SERPL-CCNC: 75 U/L
ALT SERPL-CCNC: 17 U/L
ANION GAP SERPL CALC-SCNC: 9 MMOL/L (ref 0–18)
AST SERPL-CCNC: 17 U/L (ref ?–34)
BILIRUB SERPL-MCNC: 0.3 MG/DL (ref 0.2–1.1)
BUN BLD-MCNC: 10 MG/DL (ref 9–23)
CALCIUM BLD-MCNC: 9.6 MG/DL (ref 8.7–10.6)
CHLORIDE SERPL-SCNC: 102 MMOL/L (ref 98–112)
CO2 SERPL-SCNC: 26 MMOL/L (ref 21–32)
CREAT BLD-MCNC: 1.02 MG/DL
EGFRCR SERPLBLD CKD-EPI 2021: 62 ML/MIN/1.73M2 (ref 60–?)
GLOBULIN PLAS-MCNC: 3.5 G/DL (ref 2–3.5)
GLUCOSE BLD-MCNC: 210 MG/DL (ref 70–99)
OSMOLALITY SERPL CALC.SUM OF ELEC: 289 MOSM/KG (ref 275–295)
POTASSIUM SERPL-SCNC: 3.8 MMOL/L (ref 3.5–5.1)
PROT SERPL-MCNC: 7.7 G/DL (ref 5.7–8.2)
SODIUM SERPL-SCNC: 137 MMOL/L (ref 136–145)
TROPONIN I SERPL HS-MCNC: <3 NG/L

## 2025-01-19 PROCEDURE — 70450 CT HEAD/BRAIN W/O DYE: CPT | Performed by: EMERGENCY MEDICINE

## 2025-01-19 PROCEDURE — 71045 X-RAY EXAM CHEST 1 VIEW: CPT | Performed by: EMERGENCY MEDICINE

## 2025-01-19 PROCEDURE — 93005 ELECTROCARDIOGRAM TRACING: CPT

## 2025-01-19 RX ORDER — ACETAMINOPHEN 500 MG
1000 TABLET ORAL ONCE
Status: COMPLETED | OUTPATIENT
Start: 2025-01-19 | End: 2025-01-19

## 2025-01-19 NOTE — ED PROVIDER NOTES
Patient Seen in: [facility] Emergency Department      History     Chief Complaint   Patient presents with    Hypertension    Headache     Stated Complaint: headache, HTN    Subjective:   HPI  Patient is a 65 yo F with a history of HLD, hypothyroidism who presents to ED for evaluation of intermittent headache over past 5 days with elevated BP. Headache not sudden or maximal in onset. Pt states that she has history of migraines which are usually left sided. Her headaches have sometimes been starting on L side but then migrate to the top of her head and feels like head pressure which is different for her. She notes headaches significantly improve with tylenol. She also notes when she has severe pain she gets blurry vision and sometimes b/l UE paresthesias. Currently pt has no blurry vision or paresthesias. She denies any speech changes, focal weakness. No fevers, neck stiffness. No CP, SOB, vomiting. No head injury. Tonight pt checked her BP which was 140/80s which prompted her to come to ED. Pt denies history of hypertension.     Objective:     Past Medical History:    HYPERLIPIDEMIA    HYPOTHYROIDISM    Migraines              History reviewed. No pertinent surgical history.             Social History     Socioeconomic History    Marital status:    Tobacco Use    Smoking status: Never    Smokeless tobacco: Never   Vaping Use    Vaping status: Never Used   Substance and Sexual Activity    Alcohol use: No    Drug use: No   Other Topics Concern    Caffeine Concern Yes     Comment: 1 tea daily    Exercise No                  Physical Exam     ED Triage Vitals [01/18/25 2249]   BP (!) 169/111   Pulse 86   Resp 18   Temp 97.2 °F (36.2 °C)   Temp src Temporal   SpO2 97 %   O2 Device None (Room air)       Current Vitals:   Vital Signs  BP: (!) 136/93  Pulse: 69  Resp: 17  Temp: 97.2 °F (36.2 °C)  Temp src: Temporal  MAP (mmHg): (!) 108    Oxygen Therapy  SpO2: 100 %  O2 Device: None (Room air)        Physical  Exam  Vitals and nursing note reviewed.   Constitutional:       General: She is not in acute distress.     Appearance: She is not ill-appearing.   HENT:      Head: Normocephalic and atraumatic.      Mouth/Throat:      Mouth: Mucous membranes are moist.   Eyes:      Extraocular Movements: Extraocular movements intact.      Pupils: Pupils are equal, round, and reactive to light.   Cardiovascular:      Rate and Rhythm: Normal rate and regular rhythm.   Pulmonary:      Effort: Pulmonary effort is normal.   Abdominal:      General: There is no distension.      Palpations: Abdomen is soft.      Tenderness: There is no abdominal tenderness.   Musculoskeletal:      Cervical back: Normal range of motion and neck supple.      Right lower leg: No edema.      Left lower leg: No edema.   Skin:     General: Skin is warm and dry.      Capillary Refill: Capillary refill takes less than 2 seconds.   Neurological:      General: No focal deficit present.      Mental Status: She is alert.      Comments: 5/5 strength in UE and LE bilaterally  Normal sensation  CN II-XII in tact  No pronator drift       Psychiatric:         Mood and Affect: Mood normal.             ED Course     Labs Reviewed   COMP METABOLIC PANEL (14) - Abnormal; Notable for the following components:       Result Value    Glucose 210 (*)     All other components within normal limits   TROPONIN I HIGH SENSITIVITY - Normal   CBC WITH DIFFERENTIAL WITH PLATELET   RAINBOW DRAW BLUE     EKG    Rate, intervals and axes as noted on EKG Report.  Rate: 74  Rhythm: Sinus Rhythm  Reading: NSR with ventricular rate of 74, no acute ST elevations or depressions, normal intervals, similar to prior                        MDM    Differential before testing includes but not limited to hypertensive urgency, ICH, migraine, tension headache    Patient is a 63 yo F with a history of HLD, hypothyroidism who presents to ED for evaluation of intermittent headache over past 5 days with elevated  BP, associated with intermittent bilateral UE paresthesias and blurred vision. Pt is afebrile, hypertensive, satting well on RA. Pt is well appearing, neurologically intact.     Will obtain labs, CTH, CXR, EKG. Will give tylenol for headache.     ED Course:   CBC, CMP unremarkable. Trop neg, EKG non ischemic. CXR independently reviewed, shows no acute process. CTH neg for acute process. IOP 14 in R, 15 in L eye.     Pt re-evaluated, headache improved with tylenol. Pt remains well appearing and neurologically intact.     Recommended keeping BP log at home and close f/u with PCP to have BP rechecked. BP improved in ED. Pt would like to hold off on BP medication at this time which I feel is reasonable given BP may be elevated 2/2 headache.     Patient is stable for discharge home with close PCP f/u. Discussed strict return precautions and supportive care. Patient verbalized understanding and agreement of plan.         Medical Decision Making  Amount and/or Complexity of Data Reviewed  Labs: ordered. Decision-making details documented in ED Course.  Radiology: ordered and independent interpretation performed. Decision-making details documented in ED Course.  ECG/medicine tests: ordered and independent interpretation performed. Decision-making details documented in ED Course.        Disposition and Plan     Clinical Impression:  1. Acute nonintractable headache, unspecified headache type    2. Elevated blood pressure reading         Disposition:  Discharge  1/19/2025  2:17 am    Follow-up:  Cristin Toledo DO  [address] [place] [id number] [phone]    Schedule an appointment as soon as possible for a visit in 1 day(s)            Medications Prescribed:  Discharge Medication List as of 1/19/2025  2:39 AM              Supplementary Documentation:

## 2025-01-19 NOTE — ED INITIAL ASSESSMENT (HPI)
Pt to ER ambulatory with steady gait. Pt presents with c/o headache on and off for the past week, tingling to fingers in both hands that started this morning.   Pt AO x 4, answering questions in appropriate sentences. No slurred speech/facial droop noted in triage.

## 2025-01-19 NOTE — DISCHARGE INSTRUCTIONS
You have been evaluated in the Emergency Department today for headache. Your evaluation did not show evidence of medical conditions requiring emergent intervention at this time, and your pain improved with medication in the ED.    We recommend you take 600mg ibuprofen every 6 hours or tylenol 650mg every 6 hours as needed for pain. If needed, you can alternate these medications so that you take one medication every 3 hours. For instance, at noon take ibuprofen, then at 3pm take tylenol, then at 6pm take ibuprofen.    Return to the Emergency Department if you experience worsening or uncontrolled pain, vision changes, recurrent vomiting, difficulty with normal activities, abnormal behavior, difficulty walking, numbness, weakness, or any other concerning symptoms.    Please follow up with your primary care physician within two days.

## 2025-01-20 DIAGNOSIS — E03.9 HYPOTHYROIDISM, UNSPECIFIED TYPE: ICD-10-CM

## 2025-01-20 LAB
ATRIAL RATE: 74 BPM
P AXIS: 48 DEGREES
P-R INTERVAL: 130 MS
Q-T INTERVAL: 358 MS
QRS DURATION: 76 MS
QTC CALCULATION (BEZET): 397 MS
R AXIS: -23 DEGREES
T AXIS: 36 DEGREES
VENTRICULAR RATE: 74 BPM

## 2025-01-20 RX ORDER — LEVOTHYROXINE SODIUM 88 UG/1
88 TABLET ORAL
Qty: 90 TABLET | Refills: 0 | OUTPATIENT
Start: 2025-01-20

## 2025-01-20 NOTE — TELEPHONE ENCOUNTER
Patient will need to be seen for refills. Has not been seen since 2013. Multiple messages sent-appointments made and then cancelled when med refilled

## 2025-01-22 ENCOUNTER — TELEPHONE (OUTPATIENT)
Dept: FAMILY MEDICINE CLINIC | Facility: CLINIC | Age: 65
End: 2025-01-22

## 2025-01-22 NOTE — TELEPHONE ENCOUNTER
See previous message.Patient has appointment Saturday 1/25/25 with Dr Toledo.   states he wants patient seen sooner.Yesterday BP was 145/91 and patient c/o headache.  No headache today  states they are not home and have not checked BP today. states they want her appointment moved up.  They deny need for ER or Immediate Care visit.Please advise

## 2025-01-22 NOTE — TELEPHONE ENCOUNTER
Pt has appt sched for this Saturday, 1/25.   asking for sooner appt since pt had elevated bp with headache yesterday.  Her bp today is not elevated and no headache.  Pls advise

## 2025-01-22 NOTE — TELEPHONE ENCOUNTER
Spoke with Karen about getting pt in for tomorrow or Friday.  Please follow up with her if it got done.

## 2025-01-24 ENCOUNTER — OFFICE VISIT (OUTPATIENT)
Dept: FAMILY MEDICINE CLINIC | Facility: CLINIC | Age: 65
End: 2025-01-24
Payer: COMMERCIAL

## 2025-01-24 ENCOUNTER — LAB ENCOUNTER (OUTPATIENT)
Dept: LAB | Age: 65
End: 2025-01-24
Attending: FAMILY MEDICINE
Payer: COMMERCIAL

## 2025-01-24 VITALS
SYSTOLIC BLOOD PRESSURE: 120 MMHG | HEIGHT: 62 IN | RESPIRATION RATE: 16 BRPM | WEIGHT: 144 LBS | DIASTOLIC BLOOD PRESSURE: 72 MMHG | BODY MASS INDEX: 26.5 KG/M2

## 2025-01-24 DIAGNOSIS — E53.8 B12 DEFICIENCY: ICD-10-CM

## 2025-01-24 DIAGNOSIS — E55.9 VITAMIN D DEFICIENCY: ICD-10-CM

## 2025-01-24 DIAGNOSIS — G43.919 INTRACTABLE MIGRAINE WITHOUT STATUS MIGRAINOSUS, UNSPECIFIED MIGRAINE TYPE: Primary | ICD-10-CM

## 2025-01-24 DIAGNOSIS — Z00.00 LABORATORY EXAMINATION ORDERED AS PART OF A COMPLETE PHYSICAL EXAMINATION: ICD-10-CM

## 2025-01-24 DIAGNOSIS — R73.03 PREDIABETES: ICD-10-CM

## 2025-01-24 DIAGNOSIS — E78.5 HYPERLIPIDEMIA, UNSPECIFIED HYPERLIPIDEMIA TYPE: ICD-10-CM

## 2025-01-24 LAB
ALBUMIN SERPL-MCNC: 4.7 G/DL (ref 3.2–4.8)
ALBUMIN/GLOB SERPL: 1.4 {RATIO} (ref 1–2)
ALP LIVER SERPL-CCNC: 76 U/L
ALT SERPL-CCNC: 18 U/L
ANION GAP SERPL CALC-SCNC: 12 MMOL/L (ref 0–18)
AST SERPL-CCNC: 19 U/L (ref ?–34)
BASOPHILS # BLD AUTO: 0.08 X10(3) UL (ref 0–0.2)
BASOPHILS NFR BLD AUTO: 1 %
BILIRUB SERPL-MCNC: 0.6 MG/DL (ref 0.2–1.1)
BUN BLD-MCNC: 9 MG/DL (ref 9–23)
CALCIUM BLD-MCNC: 10.1 MG/DL (ref 8.7–10.6)
CHLORIDE SERPL-SCNC: 102 MMOL/L (ref 98–112)
CHOLEST SERPL-MCNC: 336 MG/DL (ref ?–200)
CO2 SERPL-SCNC: 26 MMOL/L (ref 21–32)
CREAT BLD-MCNC: 0.88 MG/DL
EGFRCR SERPLBLD CKD-EPI 2021: 74 ML/MIN/1.73M2 (ref 60–?)
EOSINOPHIL # BLD AUTO: 0.08 X10(3) UL (ref 0–0.7)
EOSINOPHIL NFR BLD AUTO: 1 %
ERYTHROCYTE [DISTWIDTH] IN BLOOD BY AUTOMATED COUNT: 11.9 %
EST. AVERAGE GLUCOSE BLD GHB EST-MCNC: 140 MG/DL (ref 68–126)
FASTING PATIENT LIPID ANSWER: YES
FASTING STATUS PATIENT QL REPORTED: YES
GLOBULIN PLAS-MCNC: 3.3 G/DL (ref 2–3.5)
GLUCOSE BLD-MCNC: 107 MG/DL (ref 70–99)
HBA1C MFR BLD: 6.5 % (ref ?–5.7)
HCT VFR BLD AUTO: 44.5 %
HDLC SERPL-MCNC: 44 MG/DL (ref 40–59)
HGB BLD-MCNC: 15.5 G/DL
IMM GRANULOCYTES # BLD AUTO: 0.03 X10(3) UL (ref 0–1)
IMM GRANULOCYTES NFR BLD: 0.4 %
LDLC SERPL CALC-MCNC: 236 MG/DL (ref ?–100)
LYMPHOCYTES # BLD AUTO: 2.86 X10(3) UL (ref 1–4)
LYMPHOCYTES NFR BLD AUTO: 36.5 %
MCH RBC QN AUTO: 31.8 PG (ref 26–34)
MCHC RBC AUTO-ENTMCNC: 34.8 G/DL (ref 31–37)
MCV RBC AUTO: 91.2 FL
MONOCYTES # BLD AUTO: 0.44 X10(3) UL (ref 0.1–1)
MONOCYTES NFR BLD AUTO: 5.6 %
NEUTROPHILS # BLD AUTO: 4.34 X10 (3) UL (ref 1.5–7.7)
NEUTROPHILS # BLD AUTO: 4.34 X10(3) UL (ref 1.5–7.7)
NEUTROPHILS NFR BLD AUTO: 55.5 %
NONHDLC SERPL-MCNC: 292 MG/DL (ref ?–130)
OSMOLALITY SERPL CALC.SUM OF ELEC: 289 MOSM/KG (ref 275–295)
PLATELET # BLD AUTO: 415 10(3)UL (ref 150–450)
POTASSIUM SERPL-SCNC: 4.4 MMOL/L (ref 3.5–5.1)
PROT SERPL-MCNC: 8 G/DL (ref 5.7–8.2)
RBC # BLD AUTO: 4.88 X10(6)UL
SODIUM SERPL-SCNC: 140 MMOL/L (ref 136–145)
TRIGL SERPL-MCNC: 271 MG/DL (ref 30–149)
TSI SER-ACNC: 2.68 UIU/ML (ref 0.55–4.78)
VIT B12 SERPL-MCNC: 270 PG/ML (ref 211–911)
VIT D+METAB SERPL-MCNC: 31.5 NG/ML (ref 30–100)
VLDLC SERPL CALC-MCNC: 64 MG/DL (ref 0–30)
WBC # BLD AUTO: 7.8 X10(3) UL (ref 4–11)

## 2025-01-24 PROCEDURE — 80050 GENERAL HEALTH PANEL: CPT | Performed by: FAMILY MEDICINE

## 2025-01-24 PROCEDURE — 3074F SYST BP LT 130 MM HG: CPT | Performed by: FAMILY MEDICINE

## 2025-01-24 PROCEDURE — 83036 HEMOGLOBIN GLYCOSYLATED A1C: CPT | Performed by: FAMILY MEDICINE

## 2025-01-24 PROCEDURE — 82607 VITAMIN B-12: CPT | Performed by: FAMILY MEDICINE

## 2025-01-24 PROCEDURE — 3008F BODY MASS INDEX DOCD: CPT | Performed by: FAMILY MEDICINE

## 2025-01-24 PROCEDURE — 82306 VITAMIN D 25 HYDROXY: CPT | Performed by: FAMILY MEDICINE

## 2025-01-24 PROCEDURE — 3078F DIAST BP <80 MM HG: CPT | Performed by: FAMILY MEDICINE

## 2025-01-24 PROCEDURE — 99214 OFFICE O/P EST MOD 30 MIN: CPT | Performed by: FAMILY MEDICINE

## 2025-01-24 PROCEDURE — 80061 LIPID PANEL: CPT | Performed by: FAMILY MEDICINE

## 2025-01-24 RX ORDER — PROPRANOLOL HYDROCHLORIDE 60 MG/1
1 CAPSULE, EXTENDED RELEASE ORAL DAILY
Qty: 30 CAPSULE | Refills: 3 | Status: SHIPPED | OUTPATIENT
Start: 2025-01-24

## 2025-01-24 RX ORDER — SUMATRIPTAN 50 MG/1
50 TABLET, FILM COATED ORAL EVERY 2 HOUR PRN
Qty: 8 TABLET | Refills: 1 | Status: SHIPPED | OUTPATIENT
Start: 2025-01-24

## 2025-01-24 RX ORDER — EVOLOCUMAB 140 MG/ML
140 INJECTION, SOLUTION SUBCUTANEOUS EVERY 2 WEEKS
Qty: 2 EACH | Refills: 11 | Status: SHIPPED | OUTPATIENT
Start: 2025-01-24

## 2025-01-24 NOTE — PROGRESS NOTES
Subjective:   Patient ID: Mary Del Cid is a 64 year old female.    BP going up in evenings.  With HA.  BP 140s/90s. X 5 days so went to ED.  BP in ED had jumped 170s.  With headache getting vision changes, numbness in both hands and around mouth, nausea. Emesis x 1.  Tueday again had same thing happen.  Chronically poor sleep.  Doesn't drink enough water.          History/Other:   Review of Systems   All other systems reviewed and are negative.    Current Outpatient Medications   Medication Sig Dispense Refill    Cholecalciferol (VITAMIN D3) 250 MCG (40975 UT) Oral Cap Take by mouth.      Propranolol HCl ER 60 MG Oral Capsule SR 24 Hr Take 1 capsule (60 mg total) by mouth daily. 30 capsule 3    SUMAtriptan 50 MG Oral Tab Take 1 tablet (50 mg total) by mouth every 2 (two) hours as needed for Migraine (100mg max in 24 hrs). 8 tablet 1    Evolocumab (REPATHA SURECLICK) 140 MG/ML Subcutaneous Solution Auto-injector Inject 140 mg into the skin Every 2 (two) weeks. 2 each 11    levothyroxine 88 MCG Oral Tab TAKE 1 TABLET(88 MCG) BY MOUTH BEFORE BREAKFAST 90 tablet 0    Fenofibrate 67 MG Oral Cap Take 1 tablet by mouth daily. (Patient not taking: Reported on 1/24/2025) 90 capsule 1    triamcinolone 0.1 % External Cream Apply topically 2 (two) times daily as needed. Up to 5 , then 1 week break before restarting this cycle as needed. (Patient not taking: Reported on 1/24/2025) 1 each 1     Allergies:Allergies[1]    Objective:   Physical Exam  Vitals reviewed.   Constitutional:       General: She is not in acute distress.     Appearance: She is well-developed. She is not diaphoretic.   Eyes:      General: No scleral icterus.        Right eye: No discharge.         Left eye: No discharge.      Conjunctiva/sclera: Conjunctivae normal.   Cardiovascular:      Rate and Rhythm: Normal rate and regular rhythm.      Heart sounds: Normal heart sounds. No murmur heard.     No friction rub. No gallop.   Pulmonary:      Effort:  Pulmonary effort is normal. No respiratory distress.      Breath sounds: Normal breath sounds. No wheezing or rales.         Assessment & Plan:   1. Intractable migraine without status migrainosus, unspecified migraine type    2. Hyperlipidemia, unspecified hyperlipidemia type    3. Vitamin D deficiency    4. Prediabetes    5. B12 deficiency    6. Laboratory examination ordered as part of a complete physical examination      1. Intractable migraine without status migrainosus, unspecified migraine type  - Propranolol HCl ER 60 MG Oral Capsule SR 24 Hr; Take 1 capsule (60 mg total) by mouth daily.  Dispense: 30 capsule; Refill: 3  - SUMAtriptan 50 MG Oral Tab; Take 1 tablet (50 mg total) by mouth every 2 (two) hours as needed for Migraine (100mg max in 24 hrs).  Dispense: 8 tablet; Refill: 1    2. Hyperlipidemia, unspecified hyperlipidemia type  Sending repatha.    3. Vitamin D deficiency  - Vitamin D; Future    4. Prediabetes  - Hemoglobin A1C; Future    5. B12 deficiency  - Vitamin B12 [E]; Future    6. Laboratory examination ordered as part of a complete physical examination  - Lipid Panel; Future  - CBC With Differential With Platelet; Future  - Comp Metabolic Panel (14); Future  - TSH W Reflex To Free T4; Future  - Hemoglobin A1C; Future  - Vitamin D; Future  - Vitamin B12 [E]; Future  - Evolocumab (REPATHA SURECLICK) 140 MG/ML Subcutaneous Solution Auto-injector; Inject 140 mg into the skin Every 2 (two) weeks.  Dispense: 2 each; Refill: 11      Meds This Visit:  Requested Prescriptions     Signed Prescriptions Disp Refills    Propranolol HCl ER 60 MG Oral Capsule SR 24 Hr 30 capsule 3     Sig: Take 1 capsule (60 mg total) by mouth daily.    SUMAtriptan 50 MG Oral Tab 8 tablet 1     Sig: Take 1 tablet (50 mg total) by mouth every 2 (two) hours as needed for Migraine (100mg max in 24 hrs).    Evolocumab (REPATHA SURECLICK) 140 MG/ML Subcutaneous Solution Auto-injector 2 each 11     Sig: Inject 140 mg into the  skin Every 2 (two) weeks.       Imaging & Referrals:  None         [1]   Allergies  Allergen Reactions    Ezetimibe MYALGIA    Statins MYALGIA    Erythromycin NAUSEA ONLY

## 2025-01-27 ENCOUNTER — TELEPHONE (OUTPATIENT)
Dept: FAMILY MEDICINE CLINIC | Facility: CLINIC | Age: 65
End: 2025-01-27

## 2025-01-27 DIAGNOSIS — E03.9 HYPOTHYROIDISM, UNSPECIFIED TYPE: ICD-10-CM

## 2025-01-27 NOTE — TELEPHONE ENCOUNTER
Patient spouse called stating that his wife need a refill on medication:    levothyroxine 88 MCG Oral Tab     Please send prescription to:  [id number] - [place], [place] - [address] AT [facility] [address], [phone], [phone]       Please advise

## 2025-02-13 ENCOUNTER — OFFICE VISIT (OUTPATIENT)
Dept: FAMILY MEDICINE CLINIC | Facility: CLINIC | Age: 65
End: 2025-02-13
Payer: COMMERCIAL

## 2025-02-13 VITALS
RESPIRATION RATE: 16 BRPM | HEART RATE: 72 BPM | HEIGHT: 62 IN | DIASTOLIC BLOOD PRESSURE: 74 MMHG | OXYGEN SATURATION: 97 % | WEIGHT: 144 LBS | SYSTOLIC BLOOD PRESSURE: 116 MMHG | BODY MASS INDEX: 26.5 KG/M2

## 2025-02-13 DIAGNOSIS — Z12.31 ENCOUNTER FOR SCREENING MAMMOGRAM FOR MALIGNANT NEOPLASM OF BREAST: ICD-10-CM

## 2025-02-13 DIAGNOSIS — R53.82 CHRONIC FATIGUE: ICD-10-CM

## 2025-02-13 DIAGNOSIS — Z12.11 COLON CANCER SCREENING: ICD-10-CM

## 2025-02-13 DIAGNOSIS — M25.50 POLYARTHRALGIA: ICD-10-CM

## 2025-02-13 DIAGNOSIS — R07.9 CHEST PAIN, EXERTIONAL: ICD-10-CM

## 2025-02-13 DIAGNOSIS — G89.29 CHRONIC BILATERAL LOW BACK PAIN WITHOUT SCIATICA: ICD-10-CM

## 2025-02-13 DIAGNOSIS — G89.29 CHRONIC NONINTRACTABLE HEADACHE, UNSPECIFIED HEADACHE TYPE: ICD-10-CM

## 2025-02-13 DIAGNOSIS — Z00.00 ANNUAL PHYSICAL EXAM: Primary | ICD-10-CM

## 2025-02-13 DIAGNOSIS — R51.9 CHRONIC NONINTRACTABLE HEADACHE, UNSPECIFIED HEADACHE TYPE: ICD-10-CM

## 2025-02-13 DIAGNOSIS — M54.50 CHRONIC BILATERAL LOW BACK PAIN WITHOUT SCIATICA: ICD-10-CM

## 2025-02-13 DIAGNOSIS — R10.9 RIGHT FLANK PAIN: ICD-10-CM

## 2025-02-13 DIAGNOSIS — R10.9 RIGHT SIDED ABDOMINAL PAIN: ICD-10-CM

## 2025-02-13 DIAGNOSIS — E78.2 MIXED HYPERLIPIDEMIA: ICD-10-CM

## 2025-02-13 PROCEDURE — 3008F BODY MASS INDEX DOCD: CPT | Performed by: FAMILY MEDICINE

## 2025-02-13 PROCEDURE — 3078F DIAST BP <80 MM HG: CPT | Performed by: FAMILY MEDICINE

## 2025-02-13 PROCEDURE — 99212 OFFICE O/P EST SF 10 MIN: CPT | Performed by: FAMILY MEDICINE

## 2025-02-13 PROCEDURE — 3074F SYST BP LT 130 MM HG: CPT | Performed by: FAMILY MEDICINE

## 2025-02-13 PROCEDURE — 99396 PREV VISIT EST AGE 40-64: CPT | Performed by: FAMILY MEDICINE

## 2025-02-13 RX ORDER — ASPIRIN 81 MG/1
81 TABLET ORAL DAILY
Qty: 90 TABLET | Refills: 3 | Status: SHIPPED | OUTPATIENT
Start: 2025-02-13

## 2025-02-14 NOTE — H&P
Mary Del Cid is a 65 year old female who presents for a well woman physical exam:       Patient complains of:      HLD.  Chronic.  No CP/SOB.  No symptoms.  No side effects    Chronic nonintractable headache, unspecified headache type.  Trying to drink more fluids.  Not sleeping enough.      4. Chronic fatigue.    5. Polyarthralgia.  Chronic.  Testing in 2017 was negative.  But pt still with significant symptoms.  Other testing is normal.    8. Chest pain, exertional.  On going for months.  Lasting only few seconds after resting. + SOB with it.  No N/V/sweats.  No radiation into arms.  No numbness and tingling.      9. Right flank pain.  X 1 month.  + right sided abdominal pain.  No dysuria.  No diarrhea/constipation.    Current Outpatient Medications   Medication Sig Dispense Refill    aspirin 81 MG Oral Tab EC Take 1 tablet (81 mg total) by mouth daily. 90 tablet 3    Cholecalciferol (VITAMIN D3) 250 MCG (12172 UT) Oral Cap Take by mouth.      levothyroxine 88 MCG Oral Tab TAKE 1 TABLET(88 MCG) BY MOUTH BEFORE BREAKFAST 90 tablet 0    Propranolol HCl ER 60 MG Oral Capsule SR 24 Hr Take 1 capsule (60 mg total) by mouth daily. (Patient not taking: Reported on 2/13/2025) 30 capsule 3    SUMAtriptan 50 MG Oral Tab Take 1 tablet (50 mg total) by mouth every 2 (two) hours as needed for Migraine (100mg max in 24 hrs). (Patient not taking: Reported on 2/13/2025) 8 tablet 1    Evolocumab (REPATHA SURECLICK) 140 MG/ML Subcutaneous Solution Auto-injector Inject 140 mg into the skin Every 2 (two) weeks. (Patient not taking: Reported on 2/13/2025) 2 each 11    Fenofibrate 67 MG Oral Cap Take 1 tablet by mouth daily. (Patient not taking: Reported on 2/13/2025) 90 capsule 1    triamcinolone 0.1 % External Cream Apply topically 2 (two) times daily as needed. Up to 5 , then 1 week break before restarting this cycle as needed. (Patient not taking: Reported on 11/10/2023) 1 each 1      Past Medical History:    HYPERLIPIDEMIA     HYPOTHYROIDISM    Migraines      History reviewed. No pertinent surgical history.   Family History   Problem Relation Age of Onset    Other (pancreatic cancer) Father       Social History     Socioeconomic History    Marital status:    Tobacco Use    Smoking status: Never    Smokeless tobacco: Never   Vaping Use    Vaping status: Never Used   Substance and Sexual Activity    Alcohol use: No    Drug use: No   Other Topics Concern    Caffeine Concern Yes     Comment: 1 tea daily    Exercise No          Osteoperosis Prevention was discussed.  Reviewed Calcium, Vitamin D supplementation and Weight Bearing Exercises.    Patient is currently taking calcium and Vitamin D supplementation.        REVIEW OF SYSTEMS:   GENERAL: feels well otherwise  SKIN: denies any unusual skin lesions  EYES:denies blurred vision or double vision  HEENT: denies change in hearing or ear pain; denies change in vision; denies nasal congestion, sinus pain or ST  LUNGS: denies shortness of breath or chronic cough  CV: denies chest pain, pressure or palpitations  GI: denies abdominal pain,denies heartburn  : denies dysuria; denies vaginal discharge or irritation; denies dyspareunia; denies dryness  MS: denies back pain  EXT: denies LE edema  NEURO: denies frequent headaches or dizziness  PSYCH: denies change in mood    EXAM:   /74   Pulse 72   Resp 16   Ht 5' 2\" (1.575 m)   Wt 144 lb (65.3 kg)   SpO2 97%   BMI 26.34 kg/m²   Body mass index is 26.34 kg/m².   GENERAL: well developed in no apparent distress  SKIN: no rash,no suspicious lesions  HEENT: atraumatic, normocephalic, TMs clear, nose and throat clear  EYES:PERRLA, EOMI, conjunctiva are clear  NECK: supple,no adenopathy, no thyromegaly  LUNGS: CTA, easy breathing  CV: S1 S2, RRR without murmur  BREASTS:  pt refusing and also refusing to see gynocologist.  GI: good BS's,no masses, HSM or tenderness  : pt refusing and also refusing to see gynocologist.  MS: Silvia, back is  not tender  EXT: no edema  NEURO: Oriented times three,cranial nerves are intact,motor and sensory are grossly intact    ASSESSMENT AND PLAN:      1. Annual physical exam    2. Mixed hyperlipidemia  - CT CALCIUM SCORING; Future  - US CAROTID DOPPLER BILAT - DIAG IMG (CPT=93880); Future  - aspirin 81 MG Oral Tab EC; Take 1 tablet (81 mg total) by mouth daily.  Dispense: 90 tablet; Refill: 3    3. Chronic nonintractable headache, unspecified headache type  - Neuro Referral - In Network    4. Chronic fatigue  - Connective Tissue Disease (DAVION) Screen, Reflex Specific Antibody; Future  - Sed Rate, Westergren (Automated) [E]; Future  - Rheumatoid Arthritis Factor [E]; Future  - Cyclic Citrullinate Pep. IGG [E]; Future  - Ferritin; Future    5. Polyarthralgia  - Connective Tissue Disease (DAVION) Screen, Reflex Specific Antibody; Future  - Sed Rate, Westergren (Automated) [E]; Future  - Rheumatoid Arthritis Factor [E]; Future  - Cyclic Citrullinate Pep. IGG [E]; Future  - Ferritin; Future    6. Encounter for screening mammogram for malignant neoplasm of breast  - [facility] SOLO 2D+3D SCREENING BILAT (CPT=77067/71832); Future    7. Colon cancer screening  - Occult Blood, Fecal, Immunoassay (Blue cards) [E]; Future    8. Chest pain, exertional  - aspirin 81 MG Oral Tab EC; Take 1 tablet (81 mg total) by mouth daily.  Dispense: 90 tablet; Refill: 3  - CARD ECHO STRESS ECHO/REST AND STRESS(CPT=93350/81083 DM 29726); Future    9. Right flank pain  - Urinalysis, Routine [E]; Future  - US KIDNEY/BLADDER (CPT=76770); Future    10. Right sided abdominal pain  - Urinalysis, Routine [E]; Future  - US KIDNEY/BLADDER (CPT=76770); Future    Meds & Refills for this Visit:  Requested Prescriptions     Signed Prescriptions Disp Refills    aspirin 81 MG Oral Tab EC 90 tablet 3     Sig: Take 1 tablet (81 mg total) by mouth daily.       Mary was given an opportunity to ask questions and verbalized understanding of care.  Follow up 1 year.

## 2025-02-28 ENCOUNTER — LAB ENCOUNTER (OUTPATIENT)
Dept: LAB | Age: 65
End: 2025-02-28
Attending: FAMILY MEDICINE
Payer: COMMERCIAL

## 2025-02-28 DIAGNOSIS — R53.82 CHRONIC FATIGUE: ICD-10-CM

## 2025-02-28 DIAGNOSIS — M25.50 POLYARTHRALGIA: ICD-10-CM

## 2025-02-28 DIAGNOSIS — R10.9 RIGHT SIDED ABDOMINAL PAIN: ICD-10-CM

## 2025-02-28 DIAGNOSIS — R10.9 RIGHT FLANK PAIN: ICD-10-CM

## 2025-02-28 LAB
DEPRECATED HBV CORE AB SER IA-ACNC: 100 NG/ML
ERYTHROCYTE [SEDIMENTATION RATE] IN BLOOD: 35 MM/HR

## 2025-02-28 PROCEDURE — 86200 CCP ANTIBODY: CPT

## 2025-02-28 PROCEDURE — 85652 RBC SED RATE AUTOMATED: CPT

## 2025-02-28 PROCEDURE — 86225 DNA ANTIBODY NATIVE: CPT

## 2025-02-28 PROCEDURE — 82728 ASSAY OF FERRITIN: CPT

## 2025-02-28 PROCEDURE — 86038 ANTINUCLEAR ANTIBODIES: CPT

## 2025-02-28 PROCEDURE — 86431 RHEUMATOID FACTOR QUANT: CPT

## 2025-02-28 PROCEDURE — 36415 COLL VENOUS BLD VENIPUNCTURE: CPT

## 2025-03-01 DIAGNOSIS — R10.9 RIGHT FLANK PAIN: Primary | ICD-10-CM

## 2025-03-01 DIAGNOSIS — R10.9 RIGHT SIDED ABDOMINAL PAIN: ICD-10-CM

## 2025-03-01 LAB — RHEUMATOID FACT SERPL-ACNC: 5.6 IU/ML (ref ?–14)

## 2025-03-03 LAB
CCP IGG SERPL-ACNC: 1.2 U/ML (ref 0–6.9)
DSDNA IGG SERPL IA-ACNC: <0.6 IU/ML
ENA AB SER QL IA: 0.2 UG/L
ENA AB SER QL IA: NEGATIVE

## 2025-04-02 ENCOUNTER — TELEPHONE (OUTPATIENT)
Dept: PHYSICAL THERAPY | Age: 65
End: 2025-04-02

## 2025-04-10 ENCOUNTER — TELEPHONE (OUTPATIENT)
Dept: PHYSICAL THERAPY | Age: 65
End: 2025-04-10

## 2025-04-14 DIAGNOSIS — E03.9 HYPOTHYROIDISM, UNSPECIFIED TYPE: ICD-10-CM

## 2025-04-16 RX ORDER — LEVOTHYROXINE SODIUM 88 UG/1
88 TABLET ORAL
Qty: 90 TABLET | Refills: 0 | Status: SHIPPED | OUTPATIENT
Start: 2025-04-16

## 2025-04-23 ENCOUNTER — APPOINTMENT (OUTPATIENT)
Dept: PHYSICAL THERAPY | Age: 65
End: 2025-04-23
Attending: FAMILY MEDICINE
Payer: COMMERCIAL

## 2025-04-23 ENCOUNTER — TELEPHONE (OUTPATIENT)
Dept: PHYSICAL THERAPY | Facility: HOSPITAL | Age: 65
End: 2025-04-23

## 2025-04-28 ENCOUNTER — OFFICE VISIT (OUTPATIENT)
Dept: PHYSICAL THERAPY | Age: 65
End: 2025-04-28
Attending: FAMILY MEDICINE
Payer: COMMERCIAL

## 2025-04-28 DIAGNOSIS — G89.29 CHRONIC BILATERAL LOW BACK PAIN WITHOUT SCIATICA: Primary | ICD-10-CM

## 2025-04-28 DIAGNOSIS — M54.50 CHRONIC BILATERAL LOW BACK PAIN WITHOUT SCIATICA: Primary | ICD-10-CM

## 2025-04-28 PROCEDURE — 97161 PT EVAL LOW COMPLEX 20 MIN: CPT

## 2025-04-29 NOTE — PROGRESS NOTES
SPINE EVALUATION:     Diagnosis:   Chronic bilateral low back pain without sciatica (M54.50,G89.29) Patient:  Mary Del Cid (65 year old, female)        Referring Provider: Cristin Toledo  Today's Date   4/29/2025    Precautions:      Date of Evaluation: 04/28/25  Next MD visit: No data recorded  Date of Surgery: No data recorded     PATIENT SUMMARY   Summary of chief complaints: Back pain earlier in the spine, central and now shifting to the L more in the muscle.  History of current condition: The pain started many years ago.  The pain used to be for 2 days and more in the mornings. Pain more with getting up aftre long sitting on the couch, would be precipitated by lifting.   Pain level: current 0 /10, at best 0 /10, at worst 5 /10  Description of symptoms: Dull ache otherwise but with bending frwd it gets sharp   Occupation: Homemaker   Leisure activities/Hobbies: walks in summers   Prior level of function: Not very active  Current limitations: pain doesnt stop the patient to do anything. But playing and lifting her grandkid is a bit difficult  Pt goals: painfree movts of spine  Red flag signs/symptoms: Pt denies dizziness, drop attacks, dysphagia, dysarthria, diplopia; Pt denies changes in bowel/bladder function, saddle anesthesia; Pt denies pain that wakes in sleep, fever, recent trauma, history of CA, pain unchanged with movement/activity    Past medical history was reviewed with Mary.  Significant findings include:    Imaging/Tests:     Mary  has a past medical history of HYPERLIPIDEMIA, HYPOTHYROIDISM, and Migraines.  She  has no past surgical history on file.    ASSESSMENT  Mary presents to physical therapy evaluation with primary c/o Back pain earlier in the spine, central and now shifting to the L more in the muscle.. The results of the objective tests and measures show Insatbility of the spine with minial guarding. Tightness in piriformis and glutes, lower spinal muscles to guard and protect  from injury. Functional deficits include but are not limited to pain doesnt stop the patient to do anything. But playing and lifting her grandkid is a bit difficult. Signs and symptoms are consistent with diagnosis of Chronic bilateral low back pain without sciatica (M54.50,G89.29). Pt and PT discussed evaluation findings, pathology, POC and HEP.  Pt voiced understanding and performs HEP correctly without reported pain. Skilled Physical Therapy is medically necessary to address the above impairments and reach functional goals.    OBJECTIVE:    Musculoskeletal:  Observation/Posture:     Accessory Motion:     Palpation:       Special tests:   SLR -vePiriformis +v     ROM and Strength:  (* denotes performed with pain)  Trunk ROM MMT (-/5)     Flex  WFL WFL     Ext WFL WFL    R L R L     Side bend WFL WFL WFL WFL     Rotation           ,   Hip   ROM MMT (-/5)    R L R L     Flex (L2)     4+/5 4+/5     Ext              Abd             ER             IR            ,   Knee   ROM MMT (-/5)    R L R L     Flex             Ext (L3)     5/5 5/5     ,   Ankle/Foot   ROM MMT (-/5)    R L R L     PF     5/5 5/5     DF (L4)     5/5 5/5     Inversion             Eversion             Grt Toe Ext (L5)               Flexibility:  LE Flexibility R L     Hip Flexor         Hamstrings  Tight  tight     ITB         Piriformis  tighjt tight     Quads         Gastroc-soleus           Neurological:  Sensation:       Deep Tendon Reflexes:       UMN:      Otto's:       Clonus:       Babinski:       Peripheral Neurodynamic: WNL except     Balance and Functional Mobility:  Gait: pt ambulates on level ground with normal mechanics.  Balance: SLS: EO R 4 sec, EO L 10 sec          Today's Treatment and Response:   Pt education was provided on exam findings, treatment diagnosis, treatment plan, expectations, and prognosis.  Today's Treatment       4/28/2025   Spine Treatment   Evaluation Minutes 45   Total Time Of Timed Procedures 0   Total Time  Of Service-Based Procedures 45   Total Treatment Time 45        Patient was instructed in and issued a HEP for:      Charges:  PT EVAL: Low Complexity, 1eval  In agreement with evaluation findings and clinical rationale, this evaluation involved LOW COMPLEXITY decision making due to no personal factors/comorbidities, 1-2 body structures involved/activity limitations, and stable symptoms as documented in the evaluation.                                                                         PLAN OF CARE:    Goals: (to be met in 8 visits)    Not Met Progress Toward Partially Met Met   Pt will improve transversus abdominis recruitment to perform proper isometric contraction without requiring verbal or tactile cuing to promote advancement of therex. [] [] [] []   Pt will demonstrate good understanding of proper posture and body mechanics to decrease pain and improve spinal safety. [] [] [] []     Pt will have decreased paraspinal mm tension to tolerate standing >30 minutes and waking with  lifting her grandchild. [] [] [] []   Pt will demonstrate improved core strength to be able to perform 10 sit ups with <2/10 pain. [] [] [] []   Pt will be independent and compliant with comprehensive HEP to maintain progress achieved in PT [] [] [] []          Frequency / Duration: Patient will be seen 1/weekx/week or a total of 8  visits over a 90 day period. Treatment will include: Manual Therapy; Neuromuscular Re-education; Home Exercise Program instruction; Therapeutic Exercise    Education or treatment limitation: None   Rehab Potential: good     Oswestry Disability Index Score  Score: (Patient-Rptd) 8 % (4/28/2025  4:29 PM)      Patient/Family/Caregiver was advised of these findings, precautions, and treatment options and has agreed to actively participate in planning and for this course of care.    Thank you for your referral. Please co-sign or sign and return this letter via fax as soon as possible to [phone]. If you  have any questions, please contact me at Dept: [phone]    Sincerely,  Electronically signed by therapist: Avni Thomson PT  Physician's certification required: Yes  I certify the need for these services furnished under this plan of treatment and while under my care.    X___________________________________________________ Date____________________    Certification From: 4/29/2025  To:7/28/2025

## 2025-04-30 ENCOUNTER — APPOINTMENT (OUTPATIENT)
Dept: PHYSICAL THERAPY | Age: 65
End: 2025-04-30
Attending: FAMILY MEDICINE
Payer: COMMERCIAL

## 2025-05-02 ENCOUNTER — APPOINTMENT (OUTPATIENT)
Dept: PHYSICAL THERAPY | Age: 65
End: 2025-05-02
Attending: FAMILY MEDICINE
Payer: COMMERCIAL

## 2025-05-07 ENCOUNTER — APPOINTMENT (OUTPATIENT)
Dept: PHYSICAL THERAPY | Age: 65
End: 2025-05-07
Attending: FAMILY MEDICINE
Payer: COMMERCIAL

## 2025-05-08 ENCOUNTER — TELEPHONE (OUTPATIENT)
Dept: FAMILY MEDICINE CLINIC | Facility: CLINIC | Age: 65
End: 2025-05-08

## 2025-05-08 NOTE — TELEPHONE ENCOUNTER
Patient spouse calling to ask why only 30 pills with no refill on the levothyroxine, explained it may be pharmacy only releasing 30 tablets at a time but script was sent in for 90 days.  He said she is not due to be seen for a year,(last appt 2/2025) what is she supposed to do after she runs out of the 90 days of tablets w/out refill? Please review and advise or call patient spouse back,  Diane, [phone]

## 2025-05-12 ENCOUNTER — OFFICE VISIT (OUTPATIENT)
Dept: PHYSICAL THERAPY | Age: 65
End: 2025-05-12
Attending: FAMILY MEDICINE
Payer: COMMERCIAL

## 2025-05-12 PROCEDURE — 97110 THERAPEUTIC EXERCISES: CPT

## 2025-05-12 PROCEDURE — 97140 MANUAL THERAPY 1/> REGIONS: CPT

## 2025-05-13 NOTE — PROGRESS NOTES
Patient: Mary Del Cid (65 year old, female) Referring Provider:  Insurance:   Diagnosis:   Cristin Toledo  [place]   Date of Surgery: No data recorded Next MD visit:  N/A   Precautions:    No data recorded Referral Information:    Date of Evaluation: Req: 0, Auth: 0, Exp:     No data recorded POC Auth Visits:  8       Today's Date   5/12/2025    Subjective  The pt arrives that she is feeling better as she has not been baby sitting her grand kid       Pain: 2/10     Objective  No new concerns            Assessment  pt tolerated the tx session well and reported no pain in the back post exercises    Goals (to be met in 3 visits)      Not Met Progress Toward Partially Met Met   Pt will improve transversus abdominis recruitment to perform proper isometric contraction without requiring verbal or tactile cuing to promote advancement of therex. [] [] [] []   Pt will demonstrate good understanding of proper posture and body mechanics to decrease pain and improve spinal safety. [] [] [] []     Pt will have decreased paraspinal mm tension to tolerate standing >30 minutes and waking with  lifting her grandchild. [] [] [] []   Pt will demonstrate improved core strength to be able to perform 10 sit ups with <2/10 pain. [] [] [] []   Pt will be independent and compliant with comprehensive HEP to maintain progress achieved in PT [] [] [] []              Plan  continue as needed    Treatment Last 4 Visits  Treatment Day: 2       4/28/2025 5/12/2025   Spine Treatment   Therapeutic Exercise  Nu step warm up x 5 min  Post pelvic tilt x 10 x 5s  Bridging x 10 x 3s  Knee to chest x 5 x 5sec    Child pose x 10 s x 3  Cat camel x 5  Hamstring stretch x 15s x 4 reps x B/L  Review of HEP   Manual Therapy  Roller release of the hams, ITB and glutes   Therapeutic Exercise Minutes  35   Manual Therapy Minutes  8   Evaluation Minutes 45    Total Time Of Timed Procedures 0 43   Total Time Of Service-Based Procedures 45 0   Total Treatment Time  45 43   HEP  Given a print-out of ex.        HEP  Given a print-out of ex.    Charges  1Man, 2 THerex

## 2025-05-19 ENCOUNTER — APPOINTMENT (OUTPATIENT)
Dept: PHYSICAL THERAPY | Age: 65
End: 2025-05-19
Attending: FAMILY MEDICINE
Payer: COMMERCIAL

## 2025-06-02 ENCOUNTER — OFFICE VISIT (OUTPATIENT)
Dept: PHYSICAL THERAPY | Age: 65
End: 2025-06-02
Attending: FAMILY MEDICINE
Payer: COMMERCIAL

## 2025-06-02 PROCEDURE — 97110 THERAPEUTIC EXERCISES: CPT

## 2025-06-02 PROCEDURE — 97112 NEUROMUSCULAR REEDUCATION: CPT

## 2025-06-02 NOTE — PROGRESS NOTES
Patient: Mary Del Cid (65 year old, female) Referring Provider:  Insurance:   Diagnosis: Chronic bilateral low back pain without sciatica (M54.50,G89.29) Cristin Toledo  [employer]   Date of Surgery: No data recorded Next MD visit:  N/A   Precautions:    No data recorded Referral Information:    Date of Evaluation: Req: 0, Auth: 0, Exp:     04/28/25 POC Auth Visits:          Today's Date   6/2/2025    Subjective  The pt arrives that she is feeling better as she has not been baby sitting her grand kid. The pain is only if she lifts something heavy       Pain: 1/10     Objective  both wrist pain R>L since 2 weeks. Also pain in R knee to ankle x 2 weeks              Assessment       Goals (to be met in 8 visits)      Not Met Progress Toward Partially Met Met   Pt will improve transversus abdominis recruitment to perform proper isometric contraction without requiring verbal or tactile cuing to promote advancement of therex. [] [] [] []   Pt will demonstrate good understanding of proper posture and body mechanics to decrease pain and improve spinal safety. [] [] [] []     Pt will have decreased paraspinal mm tension to tolerate standing >30 minutes and waking with  lifting her grandchild. [] [] [] []   Pt will demonstrate improved core strength to be able to perform 10 sit ups with <2/10 pain. [] [] [] []   Pt will be independent and compliant with comprehensive HEP to maintain progress achieved in PT [] [] [] []                  Plan       Treatment Last 4 Visits  Treatment Day:           HEP       Charges

## 2025-06-03 NOTE — PROGRESS NOTES
Patient: Mary Del Cid (65 year old, female) Referring Provider:  Insurance:   Diagnosis:   Cristin Toledo  [place]   Date of Surgery: No data recorded Next MD visit:  N/A   Precautions:    No data recorded Referral Information:    Date of Evaluation: Req: 0, Auth: 0, Exp:     No data recorded POC Auth Visits:  8       Today's Date   6/2/2025    Subjective  The pt arrives that she is feeling better as she has not been baby sitting her grand kid. The pain is only if she lifts something heavy       Pain: 1/10     Objective  both wrist pain R>L since 2 weeks. Also pain in R knee to ankle x 2 weeks            Assessment  pt tolerated the tx session well and reported mild  pain in the back post exercises    Goals (to be met in 3 visits)      Not Met Progress Toward Partially Met Met   Pt will improve transversus abdominis recruitment to perform proper isometric contraction without requiring verbal or tactile cuing to promote advancement of therex. [] [] [] []   Pt will demonstrate good understanding of proper posture and body mechanics to decrease pain and improve spinal safety. [] [] [] []     Pt will have decreased paraspinal mm tension to tolerate standing >30 minutes and waking with  lifting her grandchild. [] [] [] []   Pt will demonstrate improved core strength to be able to perform 10 sit ups with <2/10 pain. [] [] [] []   Pt will be independent and compliant with comprehensive HEP to maintain progress achieved in PT [] [] [] []                  Plan       Treatment Last 4 Visits  Treatment Day: 3       4/28/2025 5/12/2025 6/2/2025   Spine Treatment   Therapeutic Exercise  Nu step warm up x 5 min  Post pelvic tilt x 10 x 5s  Bridging x 10 x 3s  Knee to chest x 5 x 5sec    Child pose x 10 s x 3  Cat camel x 5  Hamstring stretch x 15s x 4 reps x B/L  Review of HEP -Nu step warm up x L3 x 5 min  -LTR x 10 ea x 5 sec hold  -SLR with Strap/ eccentric lowering hip flexor strengthening x 10 x B/L.  Long arc quads x 10  x 5 sec with strap for end range extension x B/L  Calf raises x 20   Mini squats x 10 x 3 s  Hamstring stretch x 15 s x 4 reps  HEP reviewed        Neuro Re-Education   TrA activation  -with breathing  -With breathing and bridging  -with pelvic floor   Manual Therapy  Roller release of the hams, ITB and glutes    Therapeutic Exercise Minutes  35 30   Neuro Re-Educ Minutes   10   Manual Therapy Minutes  8    Evaluation Minutes 45     Total Time Of Timed Procedures 0 43 40   Total Time Of Service-Based Procedures 45 0 0   Total Treatment Time 45 43 40   HEP  Given a print-out of ex.   Access Code: [email or web address]  URL: [email or web address]  Date: 06/02/2025  Prepared by: Samridhi Thomson    Exercises  - Hooklying Transversus Abdominis Palpation  - 2 x daily - 7 x weekly - 2 sets - 10 reps - 5s hold  - Supine Bridge  - 2 x daily - 7 x weekly - 1 sets - 10 reps - 3s hold  - Supine Active Straight Leg Raise  - 1 x daily - 7 x weekly - 1 sets - 10 reps - 3s hold  - Seated Long Arc Quad  - 2 x daily - 5 x weekly - 1 sets - 20 reps - 3s hold  - Seated Hamstring Stretch  - 2 x daily - 7 x weekly - 1 sets - 4 reps - 15s hold  - Seated Hamstring Stretch (Mirrored)  - 2 x daily - 7 x weekly - 1 sets - 4 reps - 15s hold  - Prone Press Up  - 1 x daily - 7 x weekly - 1 sets - 5 reps - 5s hold            HEP    Access Code: [email or web address]  URL: [email or web address]  Date: 06/02/2025  Prepared by: Samridhi Thomson    Exercises  - Hooklying Transversus Abdominis Palpation  - 2 x daily - 7 x weekly - 2 sets - 10 reps - 5s hold  - Supine Bridge  - 2 x daily - 7 x weekly - 1 sets - 10 reps - 3s hold  - Supine Active Straight Leg Raise  - 1 x daily - 7 x weekly - 1 sets - 10 reps - 3s hold  - Seated Long Arc Quad  - 2 x daily - 5 x weekly - 1 sets - 20 reps - 3s hold  - Seated Hamstring Stretch  - 2 x daily - 7 x weekly - 1 sets - 4 reps - 15s hold  - Seated Hamstring Stretch (Mirrored)  - 2 x daily - 7 x weekly -  1 sets - 4 reps - 15s hold  - Prone Press Up  - 1 x daily - 7 x weekly - 1 sets - 5 reps - 5s hold        Charges  2 TherEx, 1 Neuro-ed

## 2025-06-09 ENCOUNTER — TELEPHONE (OUTPATIENT)
Dept: PHYSICAL THERAPY | Age: 65
End: 2025-06-09

## 2025-06-09 ENCOUNTER — APPOINTMENT (OUTPATIENT)
Dept: PHYSICAL THERAPY | Age: 65
End: 2025-06-09
Attending: FAMILY MEDICINE
Payer: COMMERCIAL

## 2025-06-11 ENCOUNTER — OFFICE VISIT (OUTPATIENT)
Dept: PHYSICAL THERAPY | Age: 65
End: 2025-06-11
Attending: FAMILY MEDICINE
Payer: COMMERCIAL

## 2025-06-11 ENCOUNTER — HOSPITAL ENCOUNTER (OUTPATIENT)
Dept: ULTRASOUND IMAGING | Facility: HOSPITAL | Age: 65
Discharge: HOME | End: 2025-06-11
Attending: FAMILY MEDICINE
Payer: COMMERCIAL

## 2025-06-11 ENCOUNTER — HOSPITAL ENCOUNTER (OUTPATIENT)
Dept: CV DIAGNOSTICS | Facility: HOSPITAL | Age: 65
Discharge: HOME | End: 2025-06-11
Attending: FAMILY MEDICINE
Payer: COMMERCIAL

## 2025-06-11 DIAGNOSIS — R07.9 CHEST PAIN, EXERTIONAL: ICD-10-CM

## 2025-06-11 DIAGNOSIS — E78.2 MIXED HYPERLIPIDEMIA: ICD-10-CM

## 2025-06-11 PROCEDURE — 93017 CV STRESS TEST TRACING ONLY: CPT | Performed by: FAMILY MEDICINE

## 2025-06-11 PROCEDURE — 97530 THERAPEUTIC ACTIVITIES: CPT

## 2025-06-11 PROCEDURE — 93350 STRESS TTE ONLY: CPT | Performed by: FAMILY MEDICINE

## 2025-06-11 PROCEDURE — 93880 EXTRACRANIAL BILAT STUDY: CPT | Performed by: FAMILY MEDICINE

## 2025-06-11 PROCEDURE — 97110 THERAPEUTIC EXERCISES: CPT

## 2025-06-11 PROCEDURE — 93018 CV STRESS TEST I&R ONLY: CPT | Performed by: FAMILY MEDICINE

## 2025-06-11 PROCEDURE — 97112 NEUROMUSCULAR REEDUCATION: CPT

## 2025-06-13 NOTE — PROGRESS NOTES
Patient: Mary Del Cid (65 year old, female) Referring Provider:  Insurance:   Diagnosis:   Cristin Toledo  [place]   Date of Surgery: No data recorded Next MD visit:  N/A   Precautions:    No data recorded Referral Information:    Date of Evaluation: Req: 0, Auth: 0, Exp:     No data recorded POC Auth Visits:  8       Today's Date   6/11/2025    Subjective  The pt arrives that she is feeling better as she has not been baby sitting her grand kid. The pain is only if she lifts something heavy       Pain: 0/10     Objective  No tenderness and no new concerns          Assessment  pt tolerated the tx session well and reported mild  pain in the back post lifting exercises. but after the roller release she left without any pain in the back    Goals (to be met in 0 visits)      Not Met Progress Toward Partially Met Met   Pt will improve transversus abdominis recruitment to perform proper isometric contraction without requiring verbal or tactile cuing to promote advancement of therex. [] [] [] []   Pt will demonstrate good understanding of proper posture and body mechanics to decrease pain and improve spinal safety. [] [] [] []     Pt will have decreased paraspinal mm tension to tolerate standing >30 minutes and waking with  lifting her grandchild. [] [] [] []   Pt will demonstrate improved core strength to be able to perform 10 sit ups with <2/10 pain. [] [] [] []   Pt will be independent and compliant with comprehensive HEP to maintain progress achieved in PT [] [] [] []                      Plan  Pt wants to use rest of her visits towards her ankle treatment, referral pending, Insurance concern    Treatment Last 4 Visits  Treatment Day: 4       4/28/2025 5/12/2025 6/2/2025 6/11/2025   Spine Treatment   Therapeutic Exercise  Nu step warm up x 5 min  Post pelvic tilt x 10 x 5s  Bridging x 10 x 3s  Knee to chest x 5 x 5sec    Child pose x 10 s x 3  Cat camel x 5  Hamstring stretch x 15s x 4 reps x B/L  Review of HEP -Nu  step warm up x L3 x 5 min  -LTR x 10 ea x 5 sec hold  -SLR with Strap/ eccentric lowering hip flexor strengthening x 10 x B/L.  Long arc quads x 10 x 5 sec with strap for end range extension x B/L  Calf raises x 20   Mini squats x 10 x 3 s  Hamstring stretch x 15 s x 4 reps  HEP reviewed      Nu step 5 min x L3 x warm up  RTB side stepping  RTB x Monster walks  Dead lifts x 20 x no wt/ with 5lbs  -walking with 10lbs kettle dumbbell at different heights x 2 laps each with core engaged.     Neuro Re-Education   TrA activation  -with breathing  -With breathing and bridging  -with pelvic floor Core engaging and TrA activation with cueing  -with heel slides   -with bridging  -with SLR  -with squats     Manual Therapy  Roller release of the hams, ITB and glutes     Therapeutic Activity    Ergonomics of lifting x 10 min   Therapeutic Exercise Minutes  35 30 23   Neuro Re-Educ Minutes   10 12   Manual Therapy Minutes  8     Therapeutic Activity Minutes    10   Evaluation Minutes 45      Total Time Of Timed Procedures 0 43 40 45   Total Time Of Service-Based Procedures 45 0 0 0   Total Treatment Time 45 43 40 45   HEP  Given a print-out of ex.   Access Code: [email or web address]  URL: [email or web address]  Date: 06/02/2025  Prepared by: Avni Thomson    Exercises  - Hooklying Transversus Abdominis Palpation  - 2 x daily - 7 x weekly - 2 sets - 10 reps - 5s hold  - Supine Bridge  - 2 x daily - 7 x weekly - 1 sets - 10 reps - 3s hold  - Supine Active Straight Leg Raise  - 1 x daily - 7 x weekly - 1 sets - 10 reps - 3s hold  - Seated Long Arc Quad  - 2 x daily - 5 x weekly - 1 sets - 20 reps - 3s hold  - Seated Hamstring Stretch  - 2 x daily - 7 x weekly - 1 sets - 4 reps - 15s hold  - Seated Hamstring Stretch (Mirrored)  - 2 x daily - 7 x weekly - 1 sets - 4 reps - 15s hold  - Prone Press Up  - 1 x daily - 7 x weekly - 1 sets - 5 reps - 5s hold             HEP    Access Code: [email or web address]  URL:  [email or web address]  Date: 06/02/2025  Prepared by: Avni Thomson    Exercises  - Hooklying Transversus Abdominis Palpation  - 2 x daily - 7 x weekly - 2 sets - 10 reps - 5s hold  - Supine Bridge  - 2 x daily - 7 x weekly - 1 sets - 10 reps - 3s hold  - Supine Active Straight Leg Raise  - 1 x daily - 7 x weekly - 1 sets - 10 reps - 3s hold  - Seated Long Arc Quad  - 2 x daily - 5 x weekly - 1 sets - 20 reps - 3s hold  - Seated Hamstring Stretch  - 2 x daily - 7 x weekly - 1 sets - 4 reps - 15s hold  - Seated Hamstring Stretch (Mirrored)  - 2 x daily - 7 x weekly - 1 sets - 4 reps - 15s hold  - Prone Press Up  - 1 x daily - 7 x weekly - 1 sets - 5 reps - 5s hold        Charges  2therEx, 1 Neuro-re-ed, 1 TherAct

## 2025-07-13 DIAGNOSIS — E03.9 HYPOTHYROIDISM, UNSPECIFIED TYPE: ICD-10-CM

## 2025-07-14 RX ORDER — LEVOTHYROXINE SODIUM 88 UG/1
88 TABLET ORAL
Qty: 90 TABLET | Refills: 0 | Status: SHIPPED | OUTPATIENT
Start: 2025-07-14

## (undated) DIAGNOSIS — E03.9 HYPOTHYROIDISM, UNSPECIFIED TYPE: ICD-10-CM

## (undated) NOTE — LETTER
12/30/20        Amy Talamantes 63  [address] [facility] [address]      Dear Dane Montanez,    [facility] records indicate that you have outstanding lab work and or testing that was ordered for you and has not yet been completed:  Orders Placed This Encounter

## (undated) NOTE — MR AVS SNAPSHOT
[address]  [address] [id number] [phone]               Thank you for choosing us for your health care visit with Auston Landau, NP.   We are glad to serve you and happy to provide you with These medications were sent to [facility],[address]Th Floor, [address] [facility] [phone],  [facility], [address] [place] [address]     Phone:  [phone]    - Levothyroxine Sodium 88 MCG Tabs            MyChart     Sign up

## (undated) NOTE — LETTER
11/14/18        Mervat Talamantes 63  [address] [facility] [address]      Dear Pawan Rogers,    [facility] records indicate that you have outstanding lab work and or testing that was ordered for you and has not yet been completed:  Orders Placed This Encounter

## (undated) NOTE — LETTER
06/13/19        Lucina Land  [facility] [address]  [address] [facility] [address]      Dear Leti Beasley,    [facility] records indicate that you have outstanding lab work and or testing that was ordered for you and has not yet been completed:  Orders Placed This Encounter